# Patient Record
Sex: FEMALE | Race: WHITE | HISPANIC OR LATINO | URBAN - METROPOLITAN AREA
[De-identification: names, ages, dates, MRNs, and addresses within clinical notes are randomized per-mention and may not be internally consistent; named-entity substitution may affect disease eponyms.]

---

## 2017-06-15 ENCOUNTER — EMERGENCY (EMERGENCY)
Facility: HOSPITAL | Age: 67
LOS: 1 days | Discharge: HOME | End: 2017-06-15
Attending: EMERGENCY MEDICINE | Admitting: EMERGENCY MEDICINE
Payer: MEDICARE

## 2017-06-15 VITALS
SYSTOLIC BLOOD PRESSURE: 145 MMHG | RESPIRATION RATE: 17 BRPM | OXYGEN SATURATION: 100 % | TEMPERATURE: 98 F | DIASTOLIC BLOOD PRESSURE: 85 MMHG | HEART RATE: 77 BPM

## 2017-06-15 DIAGNOSIS — Z90.710 ACQUIRED ABSENCE OF BOTH CERVIX AND UTERUS: Chronic | ICD-10-CM

## 2017-06-15 LAB
ALBUMIN SERPL ELPH-MCNC: 4.5 G/DL — SIGNIFICANT CHANGE UP (ref 3.3–5)
ALP SERPL-CCNC: 50 U/L — SIGNIFICANT CHANGE UP (ref 40–120)
ALT FLD-CCNC: 14 U/L — SIGNIFICANT CHANGE UP (ref 4–33)
AST SERPL-CCNC: 16 U/L — SIGNIFICANT CHANGE UP (ref 4–32)
BASOPHILS # BLD AUTO: 0.03 K/UL — SIGNIFICANT CHANGE UP (ref 0–0.2)
BASOPHILS NFR BLD AUTO: 0.4 % — SIGNIFICANT CHANGE UP (ref 0–2)
BILIRUB SERPL-MCNC: 0.2 MG/DL — SIGNIFICANT CHANGE UP (ref 0.2–1.2)
BUN SERPL-MCNC: 13 MG/DL — SIGNIFICANT CHANGE UP (ref 7–23)
CALCIUM SERPL-MCNC: 9.8 MG/DL — SIGNIFICANT CHANGE UP (ref 8.4–10.5)
CHLORIDE SERPL-SCNC: 100 MMOL/L — SIGNIFICANT CHANGE UP (ref 98–107)
CK MB BLD-MCNC: 1.21 NG/ML — SIGNIFICANT CHANGE UP (ref 1–4.7)
CK SERPL-CCNC: 103 U/L — SIGNIFICANT CHANGE UP (ref 25–170)
CO2 SERPL-SCNC: 27 MMOL/L — SIGNIFICANT CHANGE UP (ref 22–31)
CREAT SERPL-MCNC: 0.7 MG/DL — SIGNIFICANT CHANGE UP (ref 0.5–1.3)
D DIMER BLD IA.RAPID-MCNC: 236 NG/ML — SIGNIFICANT CHANGE UP
EOSINOPHIL # BLD AUTO: 0.18 K/UL — SIGNIFICANT CHANGE UP (ref 0–0.5)
EOSINOPHIL NFR BLD AUTO: 2.5 % — SIGNIFICANT CHANGE UP (ref 0–6)
GLUCOSE SERPL-MCNC: 100 MG/DL — HIGH (ref 70–99)
HCT VFR BLD CALC: 39.5 % — SIGNIFICANT CHANGE UP (ref 34.5–45)
HGB BLD-MCNC: 12.7 G/DL — SIGNIFICANT CHANGE UP (ref 11.5–15.5)
IMM GRANULOCYTES NFR BLD AUTO: 0.1 % — SIGNIFICANT CHANGE UP (ref 0–1.5)
LYMPHOCYTES # BLD AUTO: 2.62 K/UL — SIGNIFICANT CHANGE UP (ref 1–3.3)
LYMPHOCYTES # BLD AUTO: 36.5 % — SIGNIFICANT CHANGE UP (ref 13–44)
MCHC RBC-ENTMCNC: 28.1 PG — SIGNIFICANT CHANGE UP (ref 27–34)
MCHC RBC-ENTMCNC: 32.2 % — SIGNIFICANT CHANGE UP (ref 32–36)
MCV RBC AUTO: 87.4 FL — SIGNIFICANT CHANGE UP (ref 80–100)
MONOCYTES # BLD AUTO: 0.38 K/UL — SIGNIFICANT CHANGE UP (ref 0–0.9)
MONOCYTES NFR BLD AUTO: 5.3 % — SIGNIFICANT CHANGE UP (ref 2–14)
NEUTROPHILS # BLD AUTO: 3.96 K/UL — SIGNIFICANT CHANGE UP (ref 1.8–7.4)
NEUTROPHILS NFR BLD AUTO: 55.2 % — SIGNIFICANT CHANGE UP (ref 43–77)
PLATELET # BLD AUTO: 279 K/UL — SIGNIFICANT CHANGE UP (ref 150–400)
PMV BLD: 10 FL — SIGNIFICANT CHANGE UP (ref 7–13)
POTASSIUM SERPL-MCNC: 4 MMOL/L — SIGNIFICANT CHANGE UP (ref 3.5–5.3)
POTASSIUM SERPL-SCNC: 4 MMOL/L — SIGNIFICANT CHANGE UP (ref 3.5–5.3)
PROT SERPL-MCNC: 7.6 G/DL — SIGNIFICANT CHANGE UP (ref 6–8.3)
RBC # BLD: 4.52 M/UL — SIGNIFICANT CHANGE UP (ref 3.8–5.2)
RBC # FLD: 13 % — SIGNIFICANT CHANGE UP (ref 10.3–14.5)
SODIUM SERPL-SCNC: 142 MMOL/L — SIGNIFICANT CHANGE UP (ref 135–145)
TROPONIN T SERPL-MCNC: < 0.06 NG/ML — SIGNIFICANT CHANGE UP (ref 0–0.06)
WBC # BLD: 7.18 K/UL — SIGNIFICANT CHANGE UP (ref 3.8–10.5)
WBC # FLD AUTO: 7.18 K/UL — SIGNIFICANT CHANGE UP (ref 3.8–10.5)

## 2017-06-15 PROCEDURE — 71020: CPT | Mod: 26

## 2017-06-15 PROCEDURE — 71275 CT ANGIOGRAPHY CHEST: CPT | Mod: 26

## 2017-06-15 PROCEDURE — 99285 EMERGENCY DEPT VISIT HI MDM: CPT | Mod: 25,GC

## 2017-06-15 PROCEDURE — 93010 ELECTROCARDIOGRAM REPORT: CPT

## 2017-06-15 RX ORDER — SODIUM CHLORIDE 9 MG/ML
1000 INJECTION INTRAMUSCULAR; INTRAVENOUS; SUBCUTANEOUS ONCE
Qty: 0 | Refills: 0 | Status: COMPLETED | OUTPATIENT
Start: 2017-06-15 | End: 2017-06-15

## 2017-06-15 RX ORDER — IBUPROFEN 200 MG
600 TABLET ORAL ONCE
Qty: 0 | Refills: 0 | Status: COMPLETED | OUTPATIENT
Start: 2017-06-15 | End: 2017-06-15

## 2017-06-15 RX ADMIN — Medication 600 MILLIGRAM(S): at 17:38

## 2017-06-15 RX ADMIN — Medication 600 MILLIGRAM(S): at 18:08

## 2017-06-15 RX ADMIN — SODIUM CHLORIDE 1000 MILLILITER(S): 9 INJECTION INTRAMUSCULAR; INTRAVENOUS; SUBCUTANEOUS at 19:47

## 2017-06-15 NOTE — ED PROVIDER NOTE - PROGRESS NOTE DETAILS
Eric Sorto MD PGY3: Labs, imaging reviewed. Symptoms improved with symptom targeted therapy. Will discharge with instructions for outpatient follow-up. spoke with Dr. Frankel who is aware pt is here and will follow pt up

## 2017-06-15 NOTE — ED ADULT NURSE NOTE - OBJECTIVE STATEMENT
Patient alert, oriented x3, able to make needs known verbally.  Patient complaining of pain in right chest and right scapula 9/10 severity.  Patient ambulatory free from injury.  Patient vitally stable, no complaints of SOB or palpitations.  Peripheral IV started, labs obtained, medications given as per provider order.  Family at bedside for emotional support.  No sign of acute distress noted, will continue to monitor.

## 2017-06-15 NOTE — ED PROVIDER NOTE - OBJECTIVE STATEMENT
67 yo F HTN, DM, HLD presents for right scapula pain. Patient reports right scapular pain that radiates to chest, constant since last night, worse w/ movement and deep breath. Recent travel from [place] 4 days ago. Pain precedes travel.  Fevers, cough, cp, lifting, fall.  Dr. Frankel, [place]

## 2017-06-15 NOTE — ED PROVIDER NOTE - ATTENDING CONTRIBUTION TO CARE
I performed a face to face evaluation of this patient and performed a full history and physical examination on the patient.  I agree with the resident's history, physical examination, and plan of the patient. I performed a face to face evaluation of this patient and performed a full history and physical examination on the patient.  I agree with the resident's history, physical examination, and plan of the patient.  pt with right scapular pain worse with deep breath and movement and recent travel.  Well appearing with normal heart and lung exam, no cvat or midline ttp, 2+=pulses, and neuro wnl, no scapular ttp.  Worse with movement, likely msk pain, meds, labs, CXR, ekg, ddimer and reassess

## 2017-06-15 NOTE — ED ADULT TRIAGE NOTE - CHIEF COMPLAINT QUOTE
pt c/o of intermittent scapula pain radiating down the breast to the mid back. pain is reproducible with palpation. denies cp, sob. pt appears comfortable in triage.

## 2017-06-15 NOTE — ED PROVIDER NOTE - CARE PLAN
Principal Discharge DX:	Back pain  Instructions for follow-up, activity and diet:	A cause for your pain was not found today; however, we believe it is most likely a pulled muscle or other aches in the muscles of your back.  Call your primary care doctor today or tomorrow to schedule follow up appointment for within the next 3-5 days.  Continue taking your medications as prescribed.  Take ibuprofen (or Motrin) 600mg (3 tablets) up to 4 times per day as needed for pain with food or milk.   Return to this Emergency Department if you experience worsening condition or for any other emergency.

## 2017-06-15 NOTE — ED PROVIDER NOTE - PLAN OF CARE
A cause for your pain was not found today; however, we believe it is most likely a pulled muscle or other aches in the muscles of your back.  Call your primary care doctor today or tomorrow to schedule follow up appointment for within the next 3-5 days.  Continue taking your medications as prescribed.  Take ibuprofen (or Motrin) 600mg (3 tablets) up to 4 times per day as needed for pain with food or milk.   Return to this Emergency Department if you experience worsening condition or for any other emergency.

## 2019-09-14 NOTE — ED PROVIDER NOTE - CROS ED GI ALL NEG
RN went in the room to assess Pt. Pt asleep and refusing to answer RN questions. Verbally agressive when RN attempted to wake Pt up from sleep. \"What the fuck let me sleep\"   negative...

## 2021-09-01 ENCOUNTER — APPOINTMENT (OUTPATIENT)
Dept: ULTRASOUND IMAGING | Facility: CLINIC | Age: 71
End: 2021-09-01
Payer: MEDICARE

## 2021-09-01 ENCOUNTER — APPOINTMENT (OUTPATIENT)
Dept: MAMMOGRAPHY | Facility: CLINIC | Age: 71
End: 2021-09-01
Payer: MEDICARE

## 2021-09-01 PROBLEM — Z00.00 ENCOUNTER FOR PREVENTIVE HEALTH EXAMINATION: Status: ACTIVE | Noted: 2021-09-01

## 2021-09-01 PROCEDURE — 77067 SCR MAMMO BI INCL CAD: CPT

## 2021-09-01 PROCEDURE — 77063 BREAST TOMOSYNTHESIS BI: CPT

## 2021-09-01 PROCEDURE — 76641 ULTRASOUND BREAST COMPLETE: CPT | Mod: 50

## 2021-09-02 ENCOUNTER — APPOINTMENT (OUTPATIENT)
Dept: MAMMOGRAPHY | Facility: CLINIC | Age: 71
End: 2021-09-02

## 2021-09-02 ENCOUNTER — APPOINTMENT (OUTPATIENT)
Dept: ULTRASOUND IMAGING | Facility: CLINIC | Age: 71
End: 2021-09-02

## 2021-09-09 ENCOUNTER — APPOINTMENT (OUTPATIENT)
Dept: MAMMOGRAPHY | Facility: CLINIC | Age: 71
End: 2021-09-09

## 2021-09-09 ENCOUNTER — APPOINTMENT (OUTPATIENT)
Dept: ULTRASOUND IMAGING | Facility: CLINIC | Age: 71
End: 2021-09-09

## 2023-06-09 ENCOUNTER — APPOINTMENT (OUTPATIENT)
Dept: CT IMAGING | Facility: CLINIC | Age: 73
End: 2023-06-09
Payer: MEDICARE

## 2023-06-09 PROCEDURE — 75574 CT ANGIO HRT W/3D IMAGE: CPT | Mod: MH

## 2023-06-12 ENCOUNTER — NON-APPOINTMENT (OUTPATIENT)
Age: 73
End: 2023-06-12

## 2023-06-12 ENCOUNTER — APPOINTMENT (OUTPATIENT)
Dept: ELECTROPHYSIOLOGY | Facility: CLINIC | Age: 73
End: 2023-06-12
Payer: MEDICARE

## 2023-06-12 VITALS
SYSTOLIC BLOOD PRESSURE: 184 MMHG | BODY MASS INDEX: 28.19 KG/M2 | DIASTOLIC BLOOD PRESSURE: 67 MMHG | HEART RATE: 45 BPM | HEIGHT: 61 IN | WEIGHT: 149.31 LBS | OXYGEN SATURATION: 97 %

## 2023-06-12 VITALS — SYSTOLIC BLOOD PRESSURE: 152 MMHG | DIASTOLIC BLOOD PRESSURE: 74 MMHG

## 2023-06-12 DIAGNOSIS — Z78.9 OTHER SPECIFIED HEALTH STATUS: ICD-10-CM

## 2023-06-12 DIAGNOSIS — R00.2 PALPITATIONS: ICD-10-CM

## 2023-06-12 DIAGNOSIS — Z90.710 ACQUIRED ABSENCE OF BOTH CERVIX AND UTERUS: ICD-10-CM

## 2023-06-12 PROCEDURE — 93000 ELECTROCARDIOGRAM COMPLETE: CPT

## 2023-06-12 PROCEDURE — 99205 OFFICE O/P NEW HI 60 MIN: CPT

## 2023-06-12 RX ORDER — VALSARTAN 160 MG/1
160 TABLET, COATED ORAL
Refills: 0 | Status: ACTIVE | COMMUNITY

## 2023-06-12 RX ORDER — HYDROCHLOROTHIAZIDE 25 MG/1
25 TABLET ORAL
Refills: 0 | Status: ACTIVE | COMMUNITY

## 2023-06-12 RX ORDER — AMLODIPINE BESYLATE 10 MG/1
10 TABLET ORAL
Refills: 0 | Status: ACTIVE | COMMUNITY

## 2023-06-12 RX ORDER — ASPIRIN ENTERIC COATED TABLETS 81 MG 81 MG/1
81 TABLET, DELAYED RELEASE ORAL
Refills: 0 | Status: ACTIVE | COMMUNITY

## 2023-06-12 RX ORDER — ATORVASTATIN CALCIUM 20 MG/1
20 TABLET, FILM COATED ORAL
Refills: 0 | Status: ACTIVE | COMMUNITY

## 2023-06-12 NOTE — HISTORY OF PRESENT ILLNESS
[FreeTextEntry1] : Carolina Walton is a 73y/o woman with Hx of HTN, HLD, and recurrent palpitations/dizziness and noted PVCs on EKG who presents today for initial evaluation. Admits recurrent symptoms of palpitations and dizziness and fatigue. Saw Cardiologist and noted to have PVCs on EKG. Underwent 4 day Holter as well as ECHO/stress test and angiogram. Was told ECHO/stress/angio all normal. Was started on metoprolol for PVC suppression but unable to tolerate. Feels symptoms every day which is very bothersome and draining. Denies chest pain, SOB, syncope or near syncope. \par

## 2023-06-12 NOTE — CARDIOLOGY SUMMARY
[de-identified] : 6/2023: normal  [de-identified] : 6/2023: normal LV function  [de-identified] : 6/2023: normal coronaries

## 2023-06-12 NOTE — DISCUSSION/SUMMARY
[EKG obtained to assist in diagnosis and management of assessed problem(s)] : EKG obtained to assist in diagnosis and management of assessed problem(s) [FreeTextEntry1] : Impression:\par \par 1. 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 Given frequent symptomatic PVCs and inability to tolerate medication, recommend undergoing PVC ablation. Risks, benefits, and alternatives to procedure discussed at length. Risks including that of bleeding, infection, stroke, and cardiac tamponade discussed and she verbalizes understanding of all. May take all cardiac medication the day of the procedure. \par \par 2. HTN: resume oral antihypertensives as prescribed. Encouraged heart healthy diet, sodium restriction, and weight loss. Continue regular f/u with Cardiologist for further HTN management.\par \par 3. HLD: resume statin therapy as prescribed and regular f/u with Cardiologist for routine lipid monitoring and management.\par \par Plan for PVC ablation and RTO for f/u post procedure.

## 2023-06-15 ENCOUNTER — OUTPATIENT (OUTPATIENT)
Dept: OUTPATIENT SERVICES | Facility: HOSPITAL | Age: 73
LOS: 1 days | End: 2023-06-15

## 2023-06-15 VITALS
RESPIRATION RATE: 16 BRPM | HEIGHT: 60 IN | HEART RATE: 78 BPM | TEMPERATURE: 97 F | SYSTOLIC BLOOD PRESSURE: 154 MMHG | DIASTOLIC BLOOD PRESSURE: 66 MMHG | OXYGEN SATURATION: 98 % | WEIGHT: 149.91 LBS

## 2023-06-15 DIAGNOSIS — I49.3 VENTRICULAR PREMATURE DEPOLARIZATION: ICD-10-CM

## 2023-06-15 DIAGNOSIS — Z90.710 ACQUIRED ABSENCE OF BOTH CERVIX AND UTERUS: Chronic | ICD-10-CM

## 2023-06-15 LAB
ALBUMIN SERPL ELPH-MCNC: 4.5 G/DL — SIGNIFICANT CHANGE UP (ref 3.3–5)
ALP SERPL-CCNC: 51 U/L — SIGNIFICANT CHANGE UP (ref 40–120)
ALT FLD-CCNC: 11 U/L — SIGNIFICANT CHANGE UP (ref 4–33)
ANION GAP SERPL CALC-SCNC: 15 MMOL/L — HIGH (ref 7–14)
AST SERPL-CCNC: 15 U/L — SIGNIFICANT CHANGE UP (ref 4–32)
BILIRUB SERPL-MCNC: 0.2 MG/DL — SIGNIFICANT CHANGE UP (ref 0.2–1.2)
BLD GP AB SCN SERPL QL: NEGATIVE — SIGNIFICANT CHANGE UP
BUN SERPL-MCNC: 27 MG/DL — HIGH (ref 7–23)
CALCIUM SERPL-MCNC: 9.6 MG/DL — SIGNIFICANT CHANGE UP (ref 8.4–10.5)
CHLORIDE SERPL-SCNC: 100 MMOL/L — SIGNIFICANT CHANGE UP (ref 98–107)
CO2 SERPL-SCNC: 22 MMOL/L — SIGNIFICANT CHANGE UP (ref 22–31)
CREAT SERPL-MCNC: 1.05 MG/DL — SIGNIFICANT CHANGE UP (ref 0.5–1.3)
EGFR: 56 ML/MIN/1.73M2 — LOW
GLUCOSE SERPL-MCNC: 82 MG/DL — SIGNIFICANT CHANGE UP (ref 70–99)
HCT VFR BLD CALC: 36.2 % — SIGNIFICANT CHANGE UP (ref 34.5–45)
HGB BLD-MCNC: 11.8 G/DL — SIGNIFICANT CHANGE UP (ref 11.5–15.5)
MCHC RBC-ENTMCNC: 28.5 PG — SIGNIFICANT CHANGE UP (ref 27–34)
MCHC RBC-ENTMCNC: 32.6 GM/DL — SIGNIFICANT CHANGE UP (ref 32–36)
MCV RBC AUTO: 87.4 FL — SIGNIFICANT CHANGE UP (ref 80–100)
NRBC # BLD: 0 /100 WBCS — SIGNIFICANT CHANGE UP (ref 0–0)
NRBC # FLD: 0 K/UL — SIGNIFICANT CHANGE UP (ref 0–0)
PLATELET # BLD AUTO: 263 K/UL — SIGNIFICANT CHANGE UP (ref 150–400)
POTASSIUM SERPL-MCNC: 4.1 MMOL/L — SIGNIFICANT CHANGE UP (ref 3.5–5.3)
POTASSIUM SERPL-SCNC: 4.1 MMOL/L — SIGNIFICANT CHANGE UP (ref 3.5–5.3)
PROT SERPL-MCNC: 7.4 G/DL — SIGNIFICANT CHANGE UP (ref 6–8.3)
RBC # BLD: 4.14 M/UL — SIGNIFICANT CHANGE UP (ref 3.8–5.2)
RBC # FLD: 12.7 % — SIGNIFICANT CHANGE UP (ref 10.3–14.5)
RH IG SCN BLD-IMP: NEGATIVE — SIGNIFICANT CHANGE UP
SODIUM SERPL-SCNC: 137 MMOL/L — SIGNIFICANT CHANGE UP (ref 135–145)
WBC # BLD: 6.02 K/UL — SIGNIFICANT CHANGE UP (ref 3.8–10.5)
WBC # FLD AUTO: 6.02 K/UL — SIGNIFICANT CHANGE UP (ref 3.8–10.5)

## 2023-06-15 NOTE — H&P PST ADULT - NSICDXPASTMEDICALHX_GEN_ALL_CORE_FT
PAST MEDICAL HISTORY:  Asthma     Fibroids     HLD (hyperlipidemia)     HTN (hypertension)     Palpitation      PAST MEDICAL HISTORY:  Asthma     Fibroids     HLD (hyperlipidemia)     HTN (hypertension)     Palpitation     Ventricular premature depolarization

## 2023-06-15 NOTE — H&P PST ADULT - MUSCULOSKELETAL
normal/ROM intact/normal gait/strength 5/5 bilateral upper extremities/strength 5/5 bilateral lower extremities details… normal gait/strength 5/5 bilateral upper extremities/strength 5/5 bilateral lower extremities/back exam

## 2023-06-15 NOTE — H&P PST ADULT - HISTORY OF PRESENT ILLNESS
72 yr old female presents with history of fatigue, palpitations, and dizziness; Denies LOC or changes in vision  EKG noted PVC, scheduled for PVC ablation  72 yr old female presents with history of fatigue, palpitations, and dizziness while in [place], reports increased frequency; Denies LOC or changes in vision  EKG noted PVC, scheduled for PVC ablation

## 2023-06-15 NOTE — H&P PST ADULT - PROBLEM SELECTOR PLAN 1
Patient scheduled for surgery on: 6/20/23  Patient provided with verbal and written presurgical instructions  Verbalized understanding  with teach back on the following:  Chlorhexidine wash    Lab specimen drawn at PST today: cbc, cmp, type

## 2023-06-15 NOTE — H&P PST ADULT - CARDIOVASCULAR
details… normal/regular rate and rhythm/S1 S2 present/no gallops/no rub/no murmur S1 S2 present/no gallops/no rub/irregular rate and rhythm/murmur

## 2023-06-20 ENCOUNTER — INPATIENT (INPATIENT)
Facility: HOSPITAL | Age: 73
LOS: 0 days | Discharge: HOME | End: 2023-06-21
Attending: STUDENT IN AN ORGANIZED HEALTH CARE EDUCATION/TRAINING PROGRAM | Admitting: STUDENT IN AN ORGANIZED HEALTH CARE EDUCATION/TRAINING PROGRAM
Payer: MEDICARE

## 2023-06-20 VITALS
DIASTOLIC BLOOD PRESSURE: 54 MMHG | RESPIRATION RATE: 18 BRPM | SYSTOLIC BLOOD PRESSURE: 106 MMHG | OXYGEN SATURATION: 99 % | TEMPERATURE: 98 F | HEART RATE: 72 BPM

## 2023-06-20 DIAGNOSIS — I49.3 VENTRICULAR PREMATURE DEPOLARIZATION: ICD-10-CM

## 2023-06-20 DIAGNOSIS — Z90.710 ACQUIRED ABSENCE OF BOTH CERVIX AND UTERUS: Chronic | ICD-10-CM

## 2023-06-20 LAB — RH IG SCN BLD-IMP: NEGATIVE — SIGNIFICANT CHANGE UP

## 2023-06-20 PROCEDURE — 93010 ELECTROCARDIOGRAM REPORT: CPT

## 2023-06-20 PROCEDURE — 93623 PRGRMD STIMJ&PACG IV RX NFS: CPT | Mod: 26

## 2023-06-20 PROCEDURE — 93010 ELECTROCARDIOGRAM REPORT: CPT | Mod: 77

## 2023-06-20 PROCEDURE — 93662 INTRACARDIAC ECG (ICE): CPT | Mod: 26

## 2023-06-20 PROCEDURE — 93654 COMPRE EP EVAL TX VT: CPT

## 2023-06-20 RX ORDER — VALSARTAN 80 MG/1
1 TABLET ORAL
Refills: 0 | DISCHARGE

## 2023-06-20 RX ORDER — AMLODIPINE BESYLATE 2.5 MG/1
1 TABLET ORAL
Refills: 0 | DISCHARGE

## 2023-06-20 RX ORDER — HYDROCHLOROTHIAZIDE 25 MG
1 TABLET ORAL
Refills: 0 | DISCHARGE

## 2023-06-20 RX ORDER — AMLODIPINE BESYLATE 2.5 MG/1
10 TABLET ORAL DAILY
Refills: 0 | Status: DISCONTINUED | OUTPATIENT
Start: 2023-06-21 | End: 2023-06-21

## 2023-06-20 RX ORDER — MEXILETINE HYDROCHLORIDE 150 MG/1
1 CAPSULE ORAL
Qty: 60 | Refills: 1
Start: 2023-06-20 | End: 2023-08-18

## 2023-06-20 RX ORDER — ATORVASTATIN CALCIUM 80 MG/1
1 TABLET, FILM COATED ORAL
Refills: 0 | DISCHARGE

## 2023-06-20 RX ORDER — MEXILETINE HYDROCHLORIDE 150 MG/1
150 CAPSULE ORAL EVERY 12 HOURS
Refills: 0 | Status: DISCONTINUED | OUTPATIENT
Start: 2023-06-20 | End: 2023-06-21

## 2023-06-20 RX ORDER — VALSARTAN 80 MG/1
160 TABLET ORAL DAILY
Refills: 0 | Status: DISCONTINUED | OUTPATIENT
Start: 2023-06-21 | End: 2023-06-21

## 2023-06-20 RX ORDER — SODIUM CHLORIDE 9 MG/ML
3 INJECTION INTRAMUSCULAR; INTRAVENOUS; SUBCUTANEOUS EVERY 8 HOURS
Refills: 0 | Status: DISCONTINUED | OUTPATIENT
Start: 2023-06-20 | End: 2023-06-21

## 2023-06-20 RX ORDER — ATORVASTATIN CALCIUM 80 MG/1
20 TABLET, FILM COATED ORAL AT BEDTIME
Refills: 0 | Status: DISCONTINUED | OUTPATIENT
Start: 2023-06-20 | End: 2023-06-21

## 2023-06-20 RX ORDER — ASPIRIN/CALCIUM CARB/MAGNESIUM 324 MG
1 TABLET ORAL
Refills: 0 | DISCHARGE

## 2023-06-20 RX ORDER — APIXABAN 2.5 MG/1
1 TABLET, FILM COATED ORAL
Qty: 28 | Refills: 0
Start: 2023-06-20 | End: 2023-07-03

## 2023-06-20 RX ORDER — ASPIRIN/CALCIUM CARB/MAGNESIUM 324 MG
81 TABLET ORAL DAILY
Refills: 0 | Status: DISCONTINUED | OUTPATIENT
Start: 2023-06-21 | End: 2023-06-21

## 2023-06-20 RX ORDER — MEXILETINE HYDROCHLORIDE 150 MG/1
1 CAPSULE ORAL
Qty: 60 | Refills: 0
Start: 2023-06-20 | End: 2023-07-19

## 2023-06-20 RX ADMIN — SODIUM CHLORIDE 3 MILLILITER(S): 9 INJECTION INTRAMUSCULAR; INTRAVENOUS; SUBCUTANEOUS at 22:00

## 2023-06-20 RX ADMIN — ATORVASTATIN CALCIUM 20 MILLIGRAM(S): 80 TABLET, FILM COATED ORAL at 21:54

## 2023-06-20 RX ADMIN — MEXILETINE HYDROCHLORIDE 150 MILLIGRAM(S): 150 CAPSULE ORAL at 16:06

## 2023-06-20 NOTE — CHART NOTE - NSCHARTNOTEFT_GEN_A_CORE
advised by Dr Butt, patient s/p left and right sided PVC ablation via RFA/RFV access; start Mexiletine 150mg po BID, first dose when she wakes from anesthesia and Eliquis 5mg po BID for 14days. Both E-presribed to [facility]. patient advised.   Plan for discharge to home this evening if patient remains stable and groin site remains stable ambulating

## 2023-06-20 NOTE — CHART NOTE - NSCHARTNOTEFT_GEN_A_CORE
Pt is s/p left and right sided PVC ablation via right femoral access. Site is stable w/ no active bleeding, clean/dry/intact. Ecchymosis present i/s/o hematoma, nontender to palpation. DP pulse is palpable. Pt w/ no complaints at this time.    Vital Signs Last 24 Hrs  T(C): 36.3 (20 Jun 2023 21:49), Max: 36.6 (20 Jun 2023 17:45)  T(F): 97.4 (20 Jun 2023 21:49), Max: 97.8 (20 Jun 2023 17:45)  HR: 62 (20 Jun 2023 21:49) (62 - 72)  BP: 120/64 (20 Jun 2023 21:49) (106/54 - 120/64)  RR: 17 (20 Jun 2023 21:49) (17 - 18)  SpO2: 99% (20 Jun 2023 21:49) (99% - 99%)    Parameters below as of 20 Jun 2023 21:49  Patient On (Oxygen Delivery Method): room air    Pt is hemodynamically stable, will continue to monitor overnight.

## 2023-06-20 NOTE — CHART NOTE - NSCHARTNOTEFT_GEN_A_CORE
The patient presents today for PVC ablation  See H&P from presurgical testing.   The patient denies any new complaints since the last time she was seen by Dr. Butt.  Medications reviewed.    NPO Date and Time: 6/19/23 at 2100  Mallampati: 2  Anticoagulation Date and Time? N/A  Previous Endoscopy?  NO  Hx of CVA?  NO  Sleep Apnea?  NO  Dentures? NO  Loose Teeth? NO      Patient Denies:    Prior difficult intubation or airway problems  Odynophagia  Dysphagia  Esophageal stricture  Esophageal tumor  Esophageal varices  Esophageal perforation/laceration  Esophageal diverticulum  Large diaphragmatic Hernia  Active or recent upper gastrointestinal (GI) bleed  History of GI surgery  History of Esophageal surgery  History of Kingsley's esophagus  Tenuous cardiorespiratory status  Cervical spine arthritis with reduced range of motion or atlantoaxial joint disease. History of radiation to head, neck, or mediastinum  Severe thrombocytopenia  Obstructive sleep apnea The patient presents today for PVC ablation  See H&P from presurgical testing.   The patient denies any new complaints since the last time she was seen by Dr. Butt.  Medications reviewed.    NPO Date and Time: 6/19/23 at 1930  Mallampati: 2  Anticoagulation Date and Time? N/A  Previous Endoscopy?  NO  Hx of CVA?  NO  Sleep Apnea?  NO  Dentures? NO  Loose Teeth? NO      Patient Denies:    Prior difficult intubation or airway problems  Odynophagia  Dysphagia  Esophageal stricture  Esophageal tumor  Esophageal varices  Esophageal perforation/laceration  Esophageal diverticulum  Large diaphragmatic Hernia  Active or recent upper gastrointestinal (GI) bleed  History of GI surgery  History of Esophageal surgery  History of Kingsley's esophagus  Tenuous cardiorespiratory status  Cervical spine arthritis with reduced range of motion or atlantoaxial joint disease. History of radiation to head, neck, or mediastinum  Severe thrombocytopenia  Obstructive sleep apnea

## 2023-06-20 NOTE — CHART NOTE - NSCHARTNOTEFT_GEN_A_CORE
called to patients bedside as noted bleeding at groin site, arrived at bedside and RN holding pressure; I immediately took over manual compression, found large right groin hematoma from RFA site with 20minutes manual compression placed with success to soft, nontender site with +ecchymosis at site  6/20 EP: s/p left and right sided PVC ablation; RFA manual pressure, RFV vascade closure device x2  large hematoma of right groin compressed to soft, +ecchymosis, DP 2+   Dr Butt aware who saw patient at bedside  bedrest extended, patient and family aware, plan to admit overnight for groin observation

## 2023-06-21 ENCOUNTER — TRANSCRIPTION ENCOUNTER (OUTPATIENT)
Age: 73
End: 2023-06-21

## 2023-06-21 VITALS
SYSTOLIC BLOOD PRESSURE: 135 MMHG | OXYGEN SATURATION: 99 % | TEMPERATURE: 98 F | RESPIRATION RATE: 18 BRPM | HEART RATE: 66 BPM | DIASTOLIC BLOOD PRESSURE: 73 MMHG

## 2023-06-21 LAB
ANION GAP SERPL CALC-SCNC: 17 MMOL/L — HIGH (ref 7–14)
BUN SERPL-MCNC: 33 MG/DL — HIGH (ref 7–23)
CALCIUM SERPL-MCNC: 9.3 MG/DL — SIGNIFICANT CHANGE UP (ref 8.4–10.5)
CHLORIDE SERPL-SCNC: 100 MMOL/L — SIGNIFICANT CHANGE UP (ref 98–107)
CO2 SERPL-SCNC: 21 MMOL/L — LOW (ref 22–31)
CREAT SERPL-MCNC: 0.9 MG/DL — SIGNIFICANT CHANGE UP (ref 0.5–1.3)
EGFR: 67 ML/MIN/1.73M2 — SIGNIFICANT CHANGE UP
GLUCOSE SERPL-MCNC: 153 MG/DL — HIGH (ref 70–99)
HCT VFR BLD CALC: 34.6 % — SIGNIFICANT CHANGE UP (ref 34.5–45)
HGB BLD-MCNC: 11.6 G/DL — SIGNIFICANT CHANGE UP (ref 11.5–15.5)
MCHC RBC-ENTMCNC: 28.4 PG — SIGNIFICANT CHANGE UP (ref 27–34)
MCHC RBC-ENTMCNC: 33.5 GM/DL — SIGNIFICANT CHANGE UP (ref 32–36)
MCV RBC AUTO: 84.6 FL — SIGNIFICANT CHANGE UP (ref 80–100)
NRBC # BLD: 0 /100 WBCS — SIGNIFICANT CHANGE UP (ref 0–0)
NRBC # FLD: 0 K/UL — SIGNIFICANT CHANGE UP (ref 0–0)
PLATELET # BLD AUTO: 268 K/UL — SIGNIFICANT CHANGE UP (ref 150–400)
POTASSIUM SERPL-MCNC: 3.8 MMOL/L — SIGNIFICANT CHANGE UP (ref 3.5–5.3)
POTASSIUM SERPL-SCNC: 3.8 MMOL/L — SIGNIFICANT CHANGE UP (ref 3.5–5.3)
RBC # BLD: 4.09 M/UL — SIGNIFICANT CHANGE UP (ref 3.8–5.2)
RBC # FLD: 12.3 % — SIGNIFICANT CHANGE UP (ref 10.3–14.5)
SODIUM SERPL-SCNC: 138 MMOL/L — SIGNIFICANT CHANGE UP (ref 135–145)
WBC # BLD: 8.02 K/UL — SIGNIFICANT CHANGE UP (ref 3.8–10.5)
WBC # FLD AUTO: 8.02 K/UL — SIGNIFICANT CHANGE UP (ref 3.8–10.5)

## 2023-06-21 PROCEDURE — 99232 SBSQ HOSP IP/OBS MODERATE 35: CPT

## 2023-06-21 RX ORDER — APIXABAN 2.5 MG/1
1 TABLET, FILM COATED ORAL
Qty: 28 | Refills: 0
Start: 2023-06-21 | End: 2023-07-04

## 2023-06-21 RX ORDER — ATORVASTATIN CALCIUM 80 MG/1
1 TABLET, FILM COATED ORAL
Qty: 0 | Refills: 0 | DISCHARGE
Start: 2023-06-21

## 2023-06-21 RX ADMIN — MEXILETINE HYDROCHLORIDE 150 MILLIGRAM(S): 150 CAPSULE ORAL at 05:33

## 2023-06-21 RX ADMIN — VALSARTAN 160 MILLIGRAM(S): 80 TABLET ORAL at 05:34

## 2023-06-21 RX ADMIN — AMLODIPINE BESYLATE 10 MILLIGRAM(S): 2.5 TABLET ORAL at 05:34

## 2023-06-21 RX ADMIN — SODIUM CHLORIDE 3 MILLILITER(S): 9 INJECTION INTRAMUSCULAR; INTRAVENOUS; SUBCUTANEOUS at 05:56

## 2023-06-21 NOTE — DISCHARGE NOTE PROVIDER - NSDCFUSCHEDAPPT_GEN_ALL_CORE_FT
[facility]  [facility]  [phone]  Scheduled Appointment: 07/31/2023

## 2023-06-21 NOTE — PROGRESS NOTE ADULT - NS ATTEND AMEND GEN_ALL_CORE FT
72 yr old female presents with history of fatigue, palpitations, and dizziness while in [place], reports increased frequency; Denies LOC or changes in vision  EKG noted PVC s/p PVC ablation on 6/20. Cont BB and mexelitine. Start eliquis for 2 weeks. Will fu as outpt.

## 2023-06-21 NOTE — DISCHARGE NOTE NURSING/CASE MANAGEMENT/SOCIAL WORK - NSDCPEELIQUISCOMP_GEN_ALL_CORE
English
Apixaban/Eliquis is used to treat and prevent blood clots. If you are not able to swallow the tablets whole, they may be crushed and mixed in water, apple juice, or applesauce and promptly taken within four hours. Never skip a dose of Apixaban/Eliquis. If you forget to take your Apixaban/Eliquis, take a dose as soon as you remember. If it is almost time for your next Apixaban/Eliquis dose, wait until then and take a regular dose. DO NOT take an extra pill to ‘catch up’.  NEVER TAKE A DOUBLE DOSE. Notify your doctor that you missed a dose. Take Apixaban/Eliquis at the same time each morning and evening. Apixaban/Eliquis may be taken with other medication or food.

## 2023-06-21 NOTE — DISCHARGE NOTE NURSING/CASE MANAGEMENT/SOCIAL WORK - NSDCPEFALRISK_GEN_ALL_CORE
For information on Fall & Injury Prevention, visit: [email or web address] OR  [email or web address] OR  https://www.cdc.gov/steadi/patient.html

## 2023-06-21 NOTE — PROGRESS NOTE ADULT - ASSESSMENT
72 yr old female presents with history of fatigue, palpitations, and dizziness while in [place], reports increased frequency; Denies LOC or changes in vision  EKG noted PVC s/p PVC ablation on 6/20    Patient s/p ablation   Post procedure complicated by small groin hematoma that was pressed out   Currently groin dressing removed; no bleeding noted; erythema noted and site is tender to palpation; site is soft; distal pulses 2+   Discharge instructions reviewed with patient and written instructions given   Patient understands she will take off groin dressing 24 hours from procedure and then can shower with only water at the site; no soap, lotion, cream is to be applied to site ; patient will not put any dressing on the site once the initial dressing is removed  Site will not be submerged under water x 1 week including activities in the pool, bath or jacuzzi   Activity restrictions went over with patient including 1 week of refraining from strenuous activity including jogging, running, or driving   Any indication of infection including fever, chills, redness, swelling or discharge at the site - patient will call office [phone]  For serious symptoms like chest pain, SOB, lightheadedness, dizziness or syncope - patient was directed to the closest ER  All medications and diet reviewed   Follow date and time given    72 yr old female presents with history of fatigue, palpitations, and dizziness while in [place], reports increased frequency; Denies LOC or changes in vision  EKG noted PVC s/p PVC ablation on 6/20    Patient s/p ablation   Post procedure complicated by small groin hematoma that was pressed out   Currently groin dressing removed; no bleeding noted; erythema noted and site is tender to palpation; site is soft; distal pulses 2+   Discharge instructions reviewed with patient and written instructions given   Patient understands she will take off groin dressing 24 hours from procedure and then can shower with only water at the site; no soap, lotion, cream is to be applied to site ; patient will not put any dressing on the site once the initial dressing is removed  Site will not be submerged under water x 1 week including activities in the pool, bath or jacuzzi   Activity restrictions went over with patient including 1 week of refraining from strenuous activity including jogging, running, or driving   Any indication of infection including fever, chills, redness, swelling or discharge at the site - patient will call office [phone]  For serious symptoms like chest pain, SOB, lightheadedness, dizziness or syncope - patient was directed to the closest ER  All medications and diet reviewed   Follow date and time given   Eliquis 5mg PO BID x 2 weeks - start eliquis tomorrow

## 2023-06-21 NOTE — DISCHARGE NOTE NURSING/CASE MANAGEMENT/SOCIAL WORK - NSDCFUADDAPPT_GEN_ALL_CORE_FT
Follow up with Dr Butt on July 31st at 11:15am or sooner if needed- please see appointment letter  Start mexiletine 150mg oral every 12hours  Start Eliquis 5mg oral every 12hours, please take your first dose the morning of 6/22 if groin remains with no bleed and no swelling. You will be on this for 2 weeks  Continue other medications as prescribed  Tylenol as needed for site discomfort

## 2023-06-21 NOTE — DISCHARGE NOTE PROVIDER - CARE PROVIDER_API CALL
Alicia Butt  Internal Medicine  [address] [place] [address]  Phone: [phone]  Fax: [phone]  Established Patient  Follow Up Time:    Alicia Butt  Internal Medicine  [address] [place] [address]  Phone: [phone]  Fax: [phone]  Established Patient  Follow Up Time:     Elkin Caputo  Endocrinology/Metab/Diabetes  [address] [place] [address]  Phone: [phone]  Fax: [phone]  Follow Up Time:

## 2023-06-21 NOTE — DISCHARGE NOTE PROVIDER - HOSPITAL COURSE
72 yr old female presents with history of fatigue, palpitations, and dizziness while in [place], reports increased frequency; Denies LOC or changes in vision  EKG noted PVC, scheduled for PVC ablation     Ventricular premature depolarization.   - s/p PVC ab;atopm 6/20 EP: s/p left and right sided PVC ablation; RFA manual pressure, RFV vascade closure device x2  - large hematoma post procedure compressed to soft, +ecchymosis  - Admit overnight for groin site monitoring   start Eliquis 5mg po BID [id number] if EP APPROVES  [ ] started mexiletine 150mg po BID   Case discussed with EP team- pateint stable for discharge pita 72 yr old female presents with history of fatigue, palpitations, and dizziness while in [place], reports increased frequency; Denies LOC or changes in vision  EKG noted PVC, scheduled for PVC ablation     Ventricular premature depolarization.   - s/p PVC ablation 6/20 EP: s/p left and right sided PVC ablation; RFA manual pressure, RFV vascade closure device x2  - large hematoma post procedure compressed to soft, +ecchymosis. Now no hematoma, pain swelling, bleeding discharge. NO bruits. Pulses 2+ bilaterally  - Admit overnight for groin site monitoring   start Eliquis 5mg po BID [id number] approved by EP  [ ] started mexiletine 150mg po BID   Case discussed with EP team- patient stable for discharge home

## 2023-06-21 NOTE — PROGRESS NOTE ADULT - SUBJECTIVE AND OBJECTIVE BOX
Interval History:  No acute events overnight  Telemetry: NSR     MEDICATIONS  (STANDING):  amLODIPine   Tablet 10 milliGRAM(s) Oral daily  aspirin enteric coated 81 milliGRAM(s) Oral daily  atorvastatin 20 milliGRAM(s) Oral at bedtime  hydrochlorothiazide 25 milliGRAM(s) Oral daily  mexiletine 150 milliGRAM(s) Oral every 12 hours  sodium chloride 0.9% lock flush 3 milliLiter(s) IV Push every 8 hours  valsartan 160 milliGRAM(s) Oral daily    MEDICATIONS  (PRN):    Vital Signs Last 24 Hrs  T(C): 36.6 (06-21-23 @ 05:30), Max: 36.6 (06-20-23 @ 17:45)  T(F): 97.8 (06-21-23 @ 05:30), Max: 97.8 (06-20-23 @ 17:45)  HR: 66 (06-21-23 @ 05:30) (62 - 72)  BP: 135/73 (06-21-23 @ 05:30) (106/54 - 135/73)  BP(mean): --  RR: 18 (06-21-23 @ 05:30) (17 - 18)  SpO2: 99% (06-21-23 @ 05:30) (99% - 99%)    Appearance: Normal	  HEENT:   Normal oral mucosa, PERRL, EOMI	  Lymphatic: No lymphadenopathy  Cardiovascular: Normal S1 S2, No JVD, No murmurs, No edema  Respiratory: Lungs clear to auscultation	  Psychiatry: A & O x 3, Mood & affect appropriate  Gastrointestinal:  Soft, Non-tender, + BS	  Skin: No rashes, No ecchymoses, No cyanosis	  Neurologic: Non-focal  Extremities: Normal range of motion, No clubbing, cyanosis or edema  Vascular: Peripheral pulses palpable 2+ bilaterally    LABS:	 	    CBC Full  -  ( 21 Jun 2023 05:48 )  WBC Count : 8.02 K/uL  Hemoglobin : 11.6 g/dL  Hematocrit : 34.6 %  Platelet Count - Automated : 268 K/uL  Mean Cell Volume : 84.6 fL  Mean Cell Hemoglobin : 28.4 pg  Mean Cell Hemoglobin Concentration : 33.5 gm/dL  Auto Neutrophil # : x  Auto Lymphocyte # : x  Auto Monocyte # : x  Auto Eosinophil # : x  Auto Basophil # : x  Auto Neutrophil % : x  Auto Lymphocyte % : x  Auto Monocyte % : x  Auto Eosinophil % : x  Auto Basophil % : x    06-21    138  |  100  |  33<H>  ----------------------------<  153<H>  3.8   |  21<L>  |  0.90    Ca    9.3      21 Jun 2023 05:48

## 2023-06-21 NOTE — DISCHARGE NOTE PROVIDER - NSDCFUADDINST_GEN_ALL_CORE_FT
PLEASE SEE POST ABLATION INSTURCTIONS  No driving x 24 hours. No strenuous activity for three weeks. Monitor site of procedure and notify your doctor for any  bleeding / swelling/redness/ discharge/pain. You may shower but no baths or swimming x one week. No heavy lifting x 1 week.

## 2023-06-21 NOTE — DISCHARGE NOTE NURSING/CASE MANAGEMENT/SOCIAL WORK - PATIENT PORTAL LINK FT
You can access the FollowMyHealth Patient Portal offered by [facility] by registering at the following website: [email or web address]. By joining [facility]’s FollowMyHealth portal, you will also be able to view your health information using other applications (apps) compatible with our system.

## 2023-06-21 NOTE — DISCHARGE NOTE PROVIDER - NSDCMRMEDTOKEN_GEN_ALL_CORE_FT
amLODIPine 10 mg oral tablet: 1 orally once a day am  aspirin 81 mg oral tablet: 1 orally once a day am  atorvastatin 20 mg oral tablet: 1 orally once a day (at bedtime)  atorvastatin 20 mg oral tablet: 1 tab(s) orally once a day (at bedtime)  Eliquis 5 mg oral tablet: 1 tab(s) orally 2 times a day  hydroCHLOROthiazide 25 mg oral tablet: 1 orally once a day am  mexiletine 150 mg oral capsule: 1 cap(s) orally 2 times a day  valsartan 160 mg oral tablet: 1 orally once a day am   amLODIPine 10 mg oral tablet: 1 orally once a day am  aspirin 81 mg oral tablet: 1 orally once a day am  atorvastatin 20 mg oral tablet: 1 orally once a day (at bedtime)  atorvastatin 20 mg oral tablet: 1 tab(s) orally once a day (at bedtime)  Eliquis 5 mg oral tablet: 1 tab(s) orally 2 times a day Please start on 6/22/2023  hydroCHLOROthiazide 25 mg oral tablet: 1 orally once a day am  mexiletine 150 mg oral capsule: 1 cap(s) orally 2 times a day  valsartan 160 mg oral tablet: 1 orally once a day am

## 2023-06-21 NOTE — DISCHARGE NOTE PROVIDER - NSDCFUADDAPPT_GEN_ALL_CORE_FT
Follow up with Dr Butt on July 31st at 11:15am or sooner if needed- please see appointment letter  Start mexiletine 150mg oral every 12hours  Start Eliquis 5mg oral every 12hours, please take your first dose the morning of 6/21 if groin remains with no bleed and no swelling  Continue other medications as prescribed  Tylenol as needed for site discomfort Follow up with Dr Butt on July 31st at 11:15am or sooner if needed- please see appointment letter  Start mexiletine 150mg oral every 12hours  Start Eliquis 5mg oral every 12hours, please take your first dose the morning of 6/22 if groin remains with no bleed and no swelling. You will be on this for 2 weeks  Continue other medications as prescribed  Tylenol as needed for site discomfort

## 2023-06-21 NOTE — DISCHARGE NOTE PROVIDER - PROVIDER TOKENS
PROVIDER:[TOKEN:[[id number]],ESTABLISHEDPATIENT:[T]] PROVIDER:[TOKEN:[[id number]],ESTABLISHEDPATIENT:[T]],PROVIDER:[TOKEN:[[id number]]]

## 2023-06-21 NOTE — DISCHARGE NOTE PROVIDER - NSDCCPCAREPLAN_GEN_ALL_CORE_FT
PRINCIPAL DISCHARGE DIAGNOSIS  Diagnosis: Symptomatic PVCs  Assessment and Plan of Treatment: you had an ablation procedure  Start eliquis  Start mexeilitine      SECONDARY DISCHARGE DIAGNOSES  Diagnosis: Hypertension  Assessment and Plan of Treatment: low salt, low fat, low cholesterol  Continue hydochlorothiazide  Continue norvasc  Continue valsartan    Diagnosis: Hyperlipidemia  Assessment and Plan of Treatment: Continue lipitor     PRINCIPAL DISCHARGE DIAGNOSIS  Diagnosis: Symptomatic PVCs  Assessment and Plan of Treatment: You had an ablation procedure  Start eliquis for two weeks- start this on 6/22/2023  Start mexilitine  Follow up with Electrophisiology in 1-2 weeks- see appontment letter      SECONDARY DISCHARGE DIAGNOSES  Diagnosis: Hypertension  Assessment and Plan of Treatment: low salt, low fat, low cholesterol  Continue hydochlorothiazide  Continue norvasc  Continue valsartan    Diagnosis: Hyperlipidemia  Assessment and Plan of Treatment: Continue lipitor     PRINCIPAL DISCHARGE DIAGNOSIS  Diagnosis: Symptomatic PVCs  Assessment and Plan of Treatment: You had an ablation procedure  Start eliquis for two weeks- start this on 6/22/2023  Start mexilitine  Follow up with Electrophisiology in 1-2 weeks- see appontment letter      SECONDARY DISCHARGE DIAGNOSES  Diagnosis: Hypertension  Assessment and Plan of Treatment: low salt, low fat, low cholesterol  Continue hydochlorothiazide  Continue norvasc  Continue valsartan    Diagnosis: Hyperlipidemia  Assessment and Plan of Treatment: Continue lipitor    Diagnosis: Diabetes mellitus  Assessment and Plan of Treatment: 1800 calorie diabetic diet/ low salt, low fat , low cholesterol   You have a history of diabetes.Please follow up with an endocrinologist as you have discontinued all your previous medications

## 2023-06-21 NOTE — PROGRESS NOTE ADULT - SUBJECTIVE AND OBJECTIVE BOX
ANESTHESIA POSTOP CHECK    72y Female POSTOP DAY 1 S/P     Vital Signs Last 24 Hrs  T(C): 36.6 (21 Jun 2023 05:30), Max: 36.6 (20 Jun 2023 17:45)  T(F): 97.8 (21 Jun 2023 05:30), Max: 97.8 (20 Jun 2023 17:45)  HR: 66 (21 Jun 2023 05:30) (62 - 72)  BP: 135/73 (21 Jun 2023 05:30) (106/54 - 135/73)  BP(mean): --  RR: 18 (21 Jun 2023 05:30) (17 - 18)  SpO2: 99% (21 Jun 2023 05:30) (99% - 99%)    Parameters below as of 21 Jun 2023 05:30  Patient On (Oxygen Delivery Method): room air      I&O's Summary      [ x] NO APPARENT ANESTHESIA COMPLICATIONS

## 2023-07-31 ENCOUNTER — APPOINTMENT (OUTPATIENT)
Dept: ELECTROPHYSIOLOGY | Facility: CLINIC | Age: 73
End: 2023-07-31
Payer: MEDICARE

## 2023-07-31 VITALS
HEART RATE: 78 BPM | HEIGHT: 61 IN | DIASTOLIC BLOOD PRESSURE: 75 MMHG | SYSTOLIC BLOOD PRESSURE: 130 MMHG | OXYGEN SATURATION: 99 %

## 2023-07-31 DIAGNOSIS — Z87.898 PERSONAL HISTORY OF OTHER SPECIFIED CONDITIONS: ICD-10-CM

## 2023-07-31 DIAGNOSIS — I10 ESSENTIAL (PRIMARY) HYPERTENSION: ICD-10-CM

## 2023-07-31 DIAGNOSIS — I49.3 VENTRICULAR PREMATURE DEPOLARIZATION: ICD-10-CM

## 2023-07-31 DIAGNOSIS — E78.5 HYPERLIPIDEMIA, UNSPECIFIED: ICD-10-CM

## 2023-07-31 DIAGNOSIS — Z86.79 OTHER SPECIFIED POSTPROCEDURAL STATES: ICD-10-CM

## 2023-07-31 DIAGNOSIS — Z98.890 OTHER SPECIFIED POSTPROCEDURAL STATES: ICD-10-CM

## 2023-07-31 PROCEDURE — 93000 ELECTROCARDIOGRAM COMPLETE: CPT

## 2023-07-31 PROCEDURE — 99213 OFFICE O/P EST LOW 20 MIN: CPT

## 2023-07-31 NOTE — DISCUSSION/SUMMARY
[EKG obtained to assist in diagnosis and management of assessed problem(s)] : EKG obtained to assist in diagnosis and management of assessed problem(s) [FreeTextEntry1] : Impression:  1. PVCs: s/p RVOT anteroseptal and LV septum PVC ablation on 6/20/2023. EKG performed today to assess for presence of conduction disease and reveals NSR without presence of PVCs. ECHO with normal LV function. Remains off mexiletine and off AV nodals.   2. HTN: resume oral antihypertensives as prescribed. Encouraged heart healthy diet, sodium restriction, and weight loss. Continue regular f/u with Cardiologist for further HTN management.  3. HLD: resume statin therapy as prescribed and regular f/u with Cardiologist for routine lipid monitoring and management.  Continue f/u with Cardiologist and RTO for f/u in 6 months.

## 2023-07-31 NOTE — CARDIOLOGY SUMMARY
[de-identified] : 6/2023: normal  [de-identified] : 6/2023: normal LV function  [de-identified] : 6/2023: normal coronaries

## 2023-07-31 NOTE — HISTORY OF PRESENT ILLNESS
[FreeTextEntry1] : Carolina Walton is a 74y/o woman with Hx of HTN, HLD, and recurrent palpitations/dizziness and noted PVCs on EKG now s/p  RVOT anteroseptal and LV septum PVC ablation on 6/20/2023 who presents today for routine f/u post ablation. Admits doing well post procedure. Was on mexiletine but now discontinued due to adverse effects. No longer feeling any PVCs. Right groin without pain, swelling, or bruising. Denies chest pain, palpitations, SOB, syncope or near syncope.

## 2024-12-27 PROBLEM — I49.3 VENTRICULAR PREMATURE DEPOLARIZATION: Chronic | Status: ACTIVE | Noted: 2023-06-15

## 2024-12-27 PROBLEM — R00.2 PALPITATIONS: Chronic | Status: ACTIVE | Noted: 2023-06-15

## 2024-12-27 PROBLEM — J45.909 UNSPECIFIED ASTHMA, UNCOMPLICATED: Chronic | Status: ACTIVE | Noted: 2023-06-15

## 2024-12-27 PROBLEM — I10 ESSENTIAL (PRIMARY) HYPERTENSION: Chronic | Status: ACTIVE | Noted: 2023-06-15

## 2024-12-27 PROBLEM — E78.5 HYPERLIPIDEMIA, UNSPECIFIED: Chronic | Status: ACTIVE | Noted: 2023-06-15

## 2024-12-27 PROBLEM — D21.9 BENIGN NEOPLASM OF CONNECTIVE AND OTHER SOFT TISSUE, UNSPECIFIED: Chronic | Status: ACTIVE | Noted: 2023-06-15

## 2025-01-04 ENCOUNTER — EMERGENCY (EMERGENCY)
Facility: HOSPITAL | Age: 75
LOS: 1 days | Discharge: HOME | End: 2025-01-04
Attending: EMERGENCY MEDICINE | Admitting: EMERGENCY MEDICINE
Payer: MEDICARE

## 2025-01-04 VITALS
WEIGHT: 147.93 LBS | SYSTOLIC BLOOD PRESSURE: 150 MMHG | TEMPERATURE: 98 F | HEART RATE: 70 BPM | OXYGEN SATURATION: 99 % | DIASTOLIC BLOOD PRESSURE: 84 MMHG | RESPIRATION RATE: 18 BRPM

## 2025-01-04 DIAGNOSIS — Z90.710 ACQUIRED ABSENCE OF BOTH CERVIX AND UTERUS: Chronic | ICD-10-CM

## 2025-01-04 LAB
ALBUMIN SERPL ELPH-MCNC: 4.6 G/DL — SIGNIFICANT CHANGE UP (ref 3.3–5)
ALP SERPL-CCNC: 100 U/L — SIGNIFICANT CHANGE UP (ref 40–120)
ALT FLD-CCNC: 9 U/L — SIGNIFICANT CHANGE UP (ref 4–33)
ANION GAP SERPL CALC-SCNC: 16 MMOL/L — HIGH (ref 7–14)
AST SERPL-CCNC: 14 U/L — SIGNIFICANT CHANGE UP (ref 4–32)
B-OH-BUTYR SERPL-SCNC: 1.9 MMOL/L — HIGH (ref 0–0.4)
BASOPHILS # BLD AUTO: 0.04 K/UL — SIGNIFICANT CHANGE UP (ref 0–0.2)
BASOPHILS NFR BLD AUTO: 0.3 % — SIGNIFICANT CHANGE UP (ref 0–2)
BILIRUB SERPL-MCNC: 0.4 MG/DL — SIGNIFICANT CHANGE UP (ref 0.2–1.2)
BLOOD GAS VENOUS COMPREHENSIVE RESULT: SIGNIFICANT CHANGE UP
BUN SERPL-MCNC: 36 MG/DL — HIGH (ref 7–23)
CALCIUM SERPL-MCNC: 10.1 MG/DL — SIGNIFICANT CHANGE UP (ref 8.4–10.5)
CHLORIDE SERPL-SCNC: 90 MMOL/L — LOW (ref 98–107)
CO2 SERPL-SCNC: 23 MMOL/L — SIGNIFICANT CHANGE UP (ref 22–31)
CREAT SERPL-MCNC: 1.12 MG/DL — SIGNIFICANT CHANGE UP (ref 0.5–1.3)
EGFR: 51 ML/MIN/1.73M2 — LOW
EOSINOPHIL # BLD AUTO: 0 K/UL — SIGNIFICANT CHANGE UP (ref 0–0.5)
EOSINOPHIL NFR BLD AUTO: 0 % — SIGNIFICANT CHANGE UP (ref 0–6)
GLUCOSE SERPL-MCNC: 678 MG/DL — CRITICAL HIGH (ref 70–99)
HCT VFR BLD CALC: 38.5 % — SIGNIFICANT CHANGE UP (ref 34.5–45)
HGB BLD-MCNC: 13.2 G/DL — SIGNIFICANT CHANGE UP (ref 11.5–15.5)
IANC: 9.57 K/UL — HIGH (ref 1.8–7.4)
IMM GRANULOCYTES NFR BLD AUTO: 0.3 % — SIGNIFICANT CHANGE UP (ref 0–0.9)
LYMPHOCYTES # BLD AUTO: 1.67 K/UL — SIGNIFICANT CHANGE UP (ref 1–3.3)
LYMPHOCYTES # BLD AUTO: 14.1 % — SIGNIFICANT CHANGE UP (ref 13–44)
MAGNESIUM SERPL-MCNC: 2.5 MG/DL — SIGNIFICANT CHANGE UP (ref 1.6–2.6)
MCHC RBC-ENTMCNC: 28.7 PG — SIGNIFICANT CHANGE UP (ref 27–34)
MCHC RBC-ENTMCNC: 34.3 G/DL — SIGNIFICANT CHANGE UP (ref 32–36)
MCV RBC AUTO: 83.7 FL — SIGNIFICANT CHANGE UP (ref 80–100)
MONOCYTES # BLD AUTO: 0.51 K/UL — SIGNIFICANT CHANGE UP (ref 0–0.9)
MONOCYTES NFR BLD AUTO: 4.3 % — SIGNIFICANT CHANGE UP (ref 2–14)
NEUTROPHILS # BLD AUTO: 9.57 K/UL — HIGH (ref 1.8–7.4)
NEUTROPHILS NFR BLD AUTO: 81 % — HIGH (ref 43–77)
NRBC # BLD: 0 /100 WBCS — SIGNIFICANT CHANGE UP (ref 0–0)
NRBC # FLD: 0 K/UL — SIGNIFICANT CHANGE UP (ref 0–0)
PHOSPHATE SERPL-MCNC: 3.2 MG/DL — SIGNIFICANT CHANGE UP (ref 2.5–4.5)
PLATELET # BLD AUTO: 274 K/UL — SIGNIFICANT CHANGE UP (ref 150–400)
POTASSIUM SERPL-MCNC: 5.3 MMOL/L — SIGNIFICANT CHANGE UP (ref 3.5–5.3)
POTASSIUM SERPL-SCNC: 5.3 MMOL/L — SIGNIFICANT CHANGE UP (ref 3.5–5.3)
PROT SERPL-MCNC: 8 G/DL — SIGNIFICANT CHANGE UP (ref 6–8.3)
RBC # BLD: 4.6 M/UL — SIGNIFICANT CHANGE UP (ref 3.8–5.2)
RBC # FLD: 11.6 % — SIGNIFICANT CHANGE UP (ref 10.3–14.5)
SODIUM SERPL-SCNC: 129 MMOL/L — LOW (ref 135–145)
WBC # BLD: 11.83 K/UL — HIGH (ref 3.8–10.5)
WBC # FLD AUTO: 11.83 K/UL — HIGH (ref 3.8–10.5)

## 2025-01-04 PROCEDURE — 99285 EMERGENCY DEPT VISIT HI MDM: CPT

## 2025-01-04 RX ORDER — SODIUM CHLORIDE 9 MG/ML
1000 INJECTION, SOLUTION INTRAMUSCULAR; INTRAVENOUS; SUBCUTANEOUS ONCE
Refills: 0 | Status: COMPLETED | OUTPATIENT
Start: 2025-01-04 | End: 2025-01-04

## 2025-01-04 RX ORDER — INSULIN LISPRO 100/ML
10 VIAL (ML) SUBCUTANEOUS ONCE
Refills: 0 | Status: COMPLETED | OUTPATIENT
Start: 2025-01-04 | End: 2025-01-04

## 2025-01-04 RX ADMIN — SODIUM CHLORIDE 1000 MILLILITER(S): 9 INJECTION, SOLUTION INTRAMUSCULAR; INTRAVENOUS; SUBCUTANEOUS at 21:12

## 2025-01-04 RX ADMIN — SODIUM CHLORIDE 1000 MILLILITER(S): 9 INJECTION, SOLUTION INTRAMUSCULAR; INTRAVENOUS; SUBCUTANEOUS at 23:43

## 2025-01-04 RX ADMIN — Medication 10 UNIT(S): at 23:42

## 2025-01-04 NOTE — ED PROVIDER NOTE - PROGRESS NOTE DETAILS
Temo Esposito DO (PGY-2) Patient not in DKA per VBG. Given insulin and IVF for hyperglycemia. No obvious infection. Patient to be placed in CDU for A1C 11 endo consult as she is not on any DM medications.

## 2025-01-04 NOTE — ED PROVIDER NOTE - PHYSICAL EXAMINATION
GENERAL: NAD  HEENT:  Atraumatic, Dry Oral Mucosa  CHEST/LUNG: Chest rise equal bilaterally cta b/l no w/r/r  HEART: Regular rate and rhythm  ABDOMEN: Soft, Nontender, Nondistended  EXTREMITIES:  Extremities warm  PSYCH: A&Ox3  SKIN: No obvious rashes or lesions  MSK: No cervical spine TTP, able to range neck to the left and right/ No midline spinal TTP/ No swelling, redness, pain or discharge from   NEUROLOGY: strength and sensation intact in all extremities. CN 2 - 12 intact. Finger to nose test intact. No pronator drift. Ambulatory without difficulty.

## 2025-01-04 NOTE — ED PROVIDER NOTE - NSICDXFAMILYHX_GEN_ALL_CORE_FT
FAMILY HISTORY:  Sibling  Still living? Yes, Estimated age: Age Unknown  FHx: throat cancer, Age at diagnosis: Age Unknown

## 2025-01-04 NOTE — ED ADULT NURSE NOTE - NSFALLUNIVINTERV_ED_ALL_ED
Bed/Stretcher in lowest position, wheels locked, appropriate side rails in place/Call bell, personal items and telephone in reach/Instruct patient to call for assistance before getting out of bed/chair/stretcher/Non-slip footwear applied when patient is off stretcher/[place] to call system/Physically safe environment - no spills, clutter or unnecessary equipment/Purposeful proactive rounding/Room/bathroom lighting operational, light cord in reach

## 2025-01-04 NOTE — ED ADULT TRIAGE NOTE - CHIEF COMPLAINT QUOTE
Patient had been feeling not so well and had dry mouth and was drinking lots of fluid. went to see PMD, had lab work yesterday and was told to go to ER for glucose over 800. Used to take diabetic medication but was stopped because it was under control.  FS: HI

## 2025-01-04 NOTE — ED PROVIDER NOTE - NSICDXPASTMEDICALHX_GEN_ALL_CORE_FT
PAST MEDICAL HISTORY:  Asthma     Fibroids     HLD (hyperlipidemia)     HTN (hypertension)     Palpitation     Ventricular premature depolarization

## 2025-01-04 NOTE — ED PROVIDER NOTE - CLINICAL SUMMARY MEDICAL DECISION MAKING FREE TEXT BOX
Patient is a 74-year-old female, accompanied by daughter, past medical history hypertension, hyperlipidemia, DM 2 (was previously on metformin however stopped it as her sugars were well controlled) presents for CC "high blood sugar". Patient had been feeling not so well and had dry mouth and was drinking lots of fluid. went to see PMD, had lab work yesterday and was told to go to ER for glucose over 800. Patient denies any fevers, chills, nausea, vomiting, chest pain, diarrhea, constipation, painful urination, new rashes.   Medications at home include atorvastatin, diltiazem, valsartan  Vital signs nonactionable  On exam patient is well-appearing with dry mucosa.  She is ANO x 3 and moving all extremity spontaneously.  Differential diagnosis includes is not limited to hyperglycemia versus DKA and patient no longer taking diabetes medications.  Will obtain labs to eval for DKA, give fluids.

## 2025-01-04 NOTE — ED ADULT NURSE NOTE - LAST KNOWN WELL DATE/TIME
[facility] ambulatory encounter    URGENT CARE INITIAL EVALUATION    CHIEF COMPLAINT:    Chief Complaint   Patient presents with   • Vomiting     since 1030 last night   • Abdominal Pain       SUBJECTIVE:  Dejon Baum is a 3 year old male, who presents with a complaint of vomiting since yesterday. Patient had 6 episodes of vomiting last night. No episodes of vomiting today. Emesis has been non-bloody and non-bilious, it has been mostly watery and food. He reports cramping abdominal pain. No diarrhea. No f/c. + body aches and fatigue. He denies sore throat, ear pain, nasal congestion, headache, dizziness, and lightheaded. He has been drinking some water today and minimal food .      REVIEW OF SYSTEMS:    Review of Systems   A review of systems was performed and findings relevant to this complaint are included in the HPI.      HISTORIES:  ALLERGIES:  No Known Allergies    MEDICATIONS:  No current outpatient medications on file.     No current facility-administered medications for this visit.      History reviewed. No pertinent past medical history.  History reviewed. No pertinent surgical history.  Social History     Socioeconomic History   • Marital status: Single     Spouse name: None   • Number of children: None   • Years of education: None   • Highest education level: None   Social Needs   • Financial resource strain: None   • Food insecurity - worry: None   • Food insecurity - inability: None   • Transportation needs - medical: None   • Transportation needs - non-medical: None   Occupational History   • None   Tobacco Use   • Smoking status: None   Substance and Sexual Activity   • Alcohol use: None   • Drug use: None   • Sexual activity: None   Other Topics Concern   • None   Social History Narrative   • None     Social History     Tobacco Use   Smoking Status Not on file     History reviewed. No pertinent family history.    OBJECTIVE:  PHYSICAL EXAM:  Visit Vitals  Pulse 96   Temp 98.2 °F (36.8 °C)   Ht 4' 0.5\"  (1.232 m)   Wt 21.8 kg   SpO2 96%   BMI 14.35 kg/m²     General:  Well hydrated male who appears in no acute distress.  Eyes:  No jaundice, injection or drainage.  Nose: clear discharge   Oral Cavity:  No trismus.  Good dentition.  No lesions of gums or hard palate. + pharyngeal erythema  Neck:  No submandibular and cervical lymph nodes.  Supple.  Lungs: Clear to auscultation bilaterally.No wheezes, rales or rhonchi noted.   Abdomen:  Soft.  Nontender. + Hyperactive bowel sounds   Neuro: A&Ox3  Psych: Cooperative     ASSESSMENT & PLAN:  Dejon was seen today for vomiting and abdominal pain.    Diagnoses and all orders for this visit:    Viral gastroenteritis  - He was given PO fluids in clinic and he was able to tolerate 180ml.   - reassurance given to mom   - Supportive care: push fluids (gatorade or pedialyte), alternate tylenol 300mg every 4 hours with Ibuprofen 200mg every 4 hours for body aches  - discussed red flag signs and when to RTC              The patient was advised to follow up with primary physician or to recheck with the urgent care clinic sooner if symptoms get worse or if new symptoms appear.       04-Jan-2025 18:17

## 2025-01-04 NOTE — ED ADULT NURSE NOTE - OBJECTIVE STATEMENT
Patient received to room # 19, A&OX4, ambulatory, English speaking, hx. of diabetes and HTN, accompanied by daughter at bedside, coming to the ED for c/o of elevated blood glucose level (800). Patient states she was sent to the ED by PMD for high glucose level drawn yesterday. Patient denies fever, chills, chest pain, sob, abdominal pain, vomiting, or diarrhea. Patient endorses headache, dizziness, blurry vision, nausea, excessive thirst, and urinary frequency. Patient states the urinary frequency and excessive thirst began about two weeks ago when provider changed her Diltiazem dose. Patient states that she is an international resident and spends half the year in [place] which has caused her to be noncompliant with her medication adherence.  Respirations even and unlabored, abdomen soft, non distended, non tender, no neuro deficits noted. Care plan continued, comfort measures provided, safety maintained.  Awaiting orders per MD.

## 2025-01-04 NOTE — ED PROVIDER NOTE - ATTENDING CONTRIBUTION TO CARE
I performed a face-to-face evaluation of the patient and performed a history and physical examination. I agree with the history and physical examination. If this was a PA visit, I personally saw the patient with the PA and performed a substantive portion of the visit including all aspects of the medical decision making.    H/o DM. Taken off meds by her MD in [place]. C/o polydipsia and polyuria and dry mouth. BG elevated. Check DKA labs. Give IVF and insulin. If not DKA, then dc on Janumet (has Medicare) and f/u w/ her PCP.

## 2025-01-05 DIAGNOSIS — E11.65 TYPE 2 DIABETES MELLITUS WITH HYPERGLYCEMIA: ICD-10-CM

## 2025-01-05 DIAGNOSIS — E78.5 HYPERLIPIDEMIA, UNSPECIFIED: ICD-10-CM

## 2025-01-05 DIAGNOSIS — I10 ESSENTIAL (PRIMARY) HYPERTENSION: ICD-10-CM

## 2025-01-05 LAB
A1C WITH ESTIMATED AVERAGE GLUCOSE RESULT: 11 % — HIGH (ref 4–5.6)
ANION GAP SERPL CALC-SCNC: 10 MMOL/L — SIGNIFICANT CHANGE UP (ref 7–14)
APPEARANCE UR: CLEAR — SIGNIFICANT CHANGE UP
B-OH-BUTYR SERPL-SCNC: 0.2 MMOL/L — SIGNIFICANT CHANGE UP (ref 0–0.4)
BILIRUB UR-MCNC: NEGATIVE — SIGNIFICANT CHANGE UP
BUN SERPL-MCNC: 30 MG/DL — HIGH (ref 7–23)
CALCIUM SERPL-MCNC: 8.4 MG/DL — SIGNIFICANT CHANGE UP (ref 8.4–10.5)
CHLORIDE SERPL-SCNC: 104 MMOL/L — SIGNIFICANT CHANGE UP (ref 98–107)
CO2 SERPL-SCNC: 23 MMOL/L — SIGNIFICANT CHANGE UP (ref 22–31)
COLOR SPEC: ABNORMAL
CREAT SERPL-MCNC: 0.85 MG/DL — SIGNIFICANT CHANGE UP (ref 0.5–1.3)
DIFF PNL FLD: NEGATIVE — SIGNIFICANT CHANGE UP
EGFR: 71 ML/MIN/1.73M2 — SIGNIFICANT CHANGE UP
ESTIMATED AVERAGE GLUCOSE: 269 — SIGNIFICANT CHANGE UP
GLUCOSE SERPL-MCNC: 368 MG/DL — HIGH (ref 70–99)
GLUCOSE UR QL: >=1000 MG/DL
KETONES UR-MCNC: 15 MG/DL
LEUKOCYTE ESTERASE UR-ACNC: NEGATIVE — SIGNIFICANT CHANGE UP
MAGNESIUM SERPL-MCNC: 2.1 MG/DL — SIGNIFICANT CHANGE UP (ref 1.6–2.6)
NITRITE UR-MCNC: NEGATIVE — SIGNIFICANT CHANGE UP
PH UR: 6 — SIGNIFICANT CHANGE UP (ref 5–8)
PHOSPHATE SERPL-MCNC: 2.1 MG/DL — LOW (ref 2.5–4.5)
POTASSIUM SERPL-MCNC: 3.7 MMOL/L — SIGNIFICANT CHANGE UP (ref 3.5–5.3)
POTASSIUM SERPL-SCNC: 3.7 MMOL/L — SIGNIFICANT CHANGE UP (ref 3.5–5.3)
PROT UR-MCNC: SIGNIFICANT CHANGE UP MG/DL
SODIUM SERPL-SCNC: 137 MMOL/L — SIGNIFICANT CHANGE UP (ref 135–145)
SP GR SPEC: 1.03 — SIGNIFICANT CHANGE UP (ref 1–1.03)
UROBILINOGEN FLD QL: 0.2 MG/DL — SIGNIFICANT CHANGE UP (ref 0.2–1)

## 2025-01-05 PROCEDURE — 99285 EMERGENCY DEPT VISIT HI MDM: CPT

## 2025-01-05 PROCEDURE — 99223 1ST HOSP IP/OBS HIGH 75: CPT

## 2025-01-05 PROCEDURE — 93010 ELECTROCARDIOGRAM REPORT: CPT

## 2025-01-05 RX ORDER — VALSARTAN 80 MG/1
320 TABLET ORAL DAILY
Refills: 0 | Status: DISCONTINUED | OUTPATIENT
Start: 2025-01-05 | End: 2025-01-08

## 2025-01-05 RX ORDER — INSULIN GLARGINE-YFGN 100 [IU]/ML
13 INJECTION, SOLUTION SUBCUTANEOUS ONCE
Refills: 0 | Status: COMPLETED | OUTPATIENT
Start: 2025-01-05 | End: 2025-01-05

## 2025-01-05 RX ORDER — INSULIN LISPRO 100/ML
4 VIAL (ML) SUBCUTANEOUS
Refills: 0 | Status: DISCONTINUED | OUTPATIENT
Start: 2025-01-05 | End: 2025-01-05

## 2025-01-05 RX ORDER — DEXTROSE MONOHYDRATE 25 G/50ML
15 INJECTION, SOLUTION INTRAVENOUS ONCE
Refills: 0 | Status: DISCONTINUED | OUTPATIENT
Start: 2025-01-05 | End: 2025-01-08

## 2025-01-05 RX ORDER — INSULIN GLARGINE-YFGN 100 [IU]/ML
13 INJECTION, SOLUTION SUBCUTANEOUS
Refills: 0 | Status: DISCONTINUED | OUTPATIENT
Start: 2025-01-06 | End: 2025-01-06

## 2025-01-05 RX ORDER — DILTIAZEM HYDROCHLORIDE 300 MG/1
240 CAPSULE, COATED, EXTENDED RELEASE ORAL DAILY
Refills: 0 | Status: DISCONTINUED | OUTPATIENT
Start: 2025-01-05 | End: 2025-01-08

## 2025-01-05 RX ORDER — SODIUM CHLORIDE 9 MG/ML
1000 INJECTION, SOLUTION INTRAVENOUS
Refills: 0 | Status: DISCONTINUED | OUTPATIENT
Start: 2025-01-05 | End: 2025-01-08

## 2025-01-05 RX ORDER — INSULIN LISPRO 100/ML
7 VIAL (ML) SUBCUTANEOUS
Refills: 0 | Status: DISCONTINUED | OUTPATIENT
Start: 2025-01-05 | End: 2025-01-06

## 2025-01-05 RX ORDER — GLUCAGON INJECTION, SOLUTION 0.5 MG/.1ML
1 INJECTION, SOLUTION SUBCUTANEOUS ONCE
Refills: 0 | Status: DISCONTINUED | OUTPATIENT
Start: 2025-01-05 | End: 2025-01-08

## 2025-01-05 RX ORDER — DEXTROSE MONOHYDRATE 25 G/50ML
25 INJECTION, SOLUTION INTRAVENOUS ONCE
Refills: 0 | Status: DISCONTINUED | OUTPATIENT
Start: 2025-01-05 | End: 2025-01-08

## 2025-01-05 RX ORDER — INSULIN LISPRO 100/ML
VIAL (ML) SUBCUTANEOUS
Refills: 0 | Status: DISCONTINUED | OUTPATIENT
Start: 2025-01-05 | End: 2025-01-08

## 2025-01-05 RX ORDER — ATORVASTATIN CALCIUM 40 MG/1
20 TABLET, FILM COATED ORAL AT BEDTIME
Refills: 0 | Status: DISCONTINUED | OUTPATIENT
Start: 2025-01-05 | End: 2025-01-08

## 2025-01-05 RX ORDER — INSULIN LISPRO 100/ML
VIAL (ML) SUBCUTANEOUS AT BEDTIME
Refills: 0 | Status: DISCONTINUED | OUTPATIENT
Start: 2025-01-05 | End: 2025-01-08

## 2025-01-05 RX ORDER — DEXTROSE MONOHYDRATE 25 G/50ML
12.5 INJECTION, SOLUTION INTRAVENOUS ONCE
Refills: 0 | Status: DISCONTINUED | OUTPATIENT
Start: 2025-01-05 | End: 2025-01-08

## 2025-01-05 RX ADMIN — VALSARTAN 320 MILLIGRAM(S): 80 TABLET ORAL at 06:44

## 2025-01-05 RX ADMIN — Medication 3: at 12:05

## 2025-01-05 RX ADMIN — Medication 4 UNIT(S): at 07:48

## 2025-01-05 RX ADMIN — Medication 4: at 07:48

## 2025-01-05 RX ADMIN — DILTIAZEM HYDROCHLORIDE 240 MILLIGRAM(S): 300 CAPSULE, COATED, EXTENDED RELEASE ORAL at 06:44

## 2025-01-05 RX ADMIN — ATORVASTATIN CALCIUM 20 MILLIGRAM(S): 40 TABLET, FILM COATED ORAL at 22:25

## 2025-01-05 RX ADMIN — Medication 4 UNIT(S): at 17:43

## 2025-01-05 RX ADMIN — Medication 4 UNIT(S): at 12:05

## 2025-01-05 RX ADMIN — Medication 4: at 17:43

## 2025-01-05 RX ADMIN — INSULIN GLARGINE-YFGN 13 UNIT(S): 100 INJECTION, SOLUTION SUBCUTANEOUS at 07:52

## 2025-01-05 NOTE — ED CDU PROVIDER INITIAL DAY NOTE - CLINICAL SUMMARY MEDICAL DECISION MAKING FREE TEXT BOX
74-year-old female, accompanied by daughter, past medical history hypertension, hyperlipidemia, DM 2 (was previously on metformin however stopped it as her sugars were well controlled) presents for CC "high blood sugar". Patient had been feeling not so well and had dry mouth and was drinking lots of fluid. went to see PMD, had lab work yesterday and was told to go to ER for glucose over 800. Patient denies any fevers, chills, nausea, vomiting, chest pain, diarrhea, constipation, painful urination, new rashes. In ER initial glucose 678, beta hydroxybutyrate 1.9, A1c 11, VBG with no evidence of acidosis.  Patient was given 2 L of IV fluids, and add him along 10 units with improvement of glucose of 368 and beta hydroxybutyrate of 0.2.  Patient started on weight-based insulin regiment and placed in CDU for endocrine consult. 74-year-old female, accompanied by daughter, past medical history hypertension, hyperlipidemia, DM 2 (was previously on metformin however stopped it as her sugars were well controlled) presents for CC "high blood sugar". Patient had been feeling not so well and had dry mouth and was drinking lots of fluid. went to see PMD, had lab work yesterday and was told to go to ER for glucose over 800. Patient denies any fevers, chills, nausea, vomiting, chest pain, diarrhea, constipation, painful urination, new rashes. In ER initial glucose 678, beta hydroxybutyrate 1.9, A1c 11, VBG with no evidence of acidosis.  Patient was given 2 L of IV fluids, and admelog 10 units with improvement of glucose of 368 and beta hydroxybutyrate of 0.2.  Patient started on weight-based insulin regiment and placed in CDU for endocrine consult.

## 2025-01-05 NOTE — ED CDU PROVIDER INITIAL DAY NOTE - ATTENDING APP SHARED VISIT CONTRIBUTION OF CARE
I was physically present for the E/M service provided. I agree with above history, physical, and plan which I have reviewed and edited where appropriate. I was physically present for the key portions of the service provided. Harris Anthony DO:  patient seen and evaluated with the PA.  I was present for key portions of the History & Physical, and I agree with the Impression & Plan. 73 yo f pmh HTN, HLD, DM2, s/p cardiac ablation 2023 , pw hyperglycemia. w/u reviewed. cdu for dm management, and endo eval.

## 2025-01-05 NOTE — CONSULT NOTE ADULT - ATTENDING COMMENTS
74 yr old female with PMHx of HTN, HLD, T2DM who presented with hyperglycemia. Patient had stopped taking medications for diabetes a couple of years ago and stopped monitoring BG. Found to have BG>800, A1c 11%. Endocrine team consulted for uncontrolled diabetes. Patient is high risk with high level decision making due to uncontrolled diabetes which places patient at high risk for cardiovascular and cerebrovascular events. Patient with lability of glucose requiring close monitoring and insulin adjustments. Titrating basal bolus insulin regimen as discussed in plan above. Discussed importance of carb consistent diet, avoiding sugary beverages. For discharge, consider basal insulin with metformin.

## 2025-01-05 NOTE — CONSULT NOTE ADULT - ASSESSMENT
74F PMH HTN, HLD, DM2, s/p cardiac ablation 2023 presents with significant hyperglycemia on OP labs. Patient reports 1 week of dry mouth, polyuria/polydipsia and fatigue after recent increase in her diltiazem dosing. Went to new PCP at [facility] who obtained labs and referred patient urgently to ED for BG >800. In ED, initial , BHB 1.9, A1c 11%, with no evidence of acidosis. Patient was given 2L of IV fluids and admelog 10 units x1 with improvement of glucose to 368 and BHB to 0.2.  Patient started on weight-based insulin regimen in CDU. Endocrine consulted for further management.     #Uncontrolled T2DM  - A1c 11%  - Off of metformin x 5 years, not on meds at present  - Follows with  - Started on Lantus 13 units x1 at 07h52 and Admelog 4 units tidac + low Admelog scale ac/hs    PLAN  While inpatient:  - c/w Lantus 13 units QHS   - increase Admelog to 7 units TIDAC (HOLD if NPO)  - c/w low dose correctional insulin with meals and low dose correctional insulin at bedtime  - Check FS before meals and at bedtime - if NPO, check q6 hours  - Goal -180  - Appreciate nutrition consult  - Please obtain lipid panel in AM     Discharge Recommendations:  - Will send on basal insulin (dose TBD) + metformin 500 mg BID to be uptitrated to 1000 mg BID after 1 week  - Please have RN provide insulin teaching at bedside  - Please send:  -- Dexcom G7 sensor and reader  -- Glucometer (ACCU_CHECK robert Connect, Ascensia Contour Next EZ or One, Freestyle Harwood LITE or OneTouch Verio IQ)  -- Glucometer test strips and lancets  (make sure compatible with glucometer), dispense #100 (or #200) use as directed, alcohol pads  -- BD jono 4mm pen needles  -- Glucose tabs, Baqsimi nasal spray or glucagon emergency kit for hypoglycemia risk   - If patient is not covered for CGM, patient should check FSBG premeals and bedtime. Patient should call their doctor when FSBG <70 or above >400 and or consistently above 200s as changes in the regimen will have to be made.   - Patient can follow up at [facility] ([phone]) at [address], [place] [address] - I will reach out to our coordinator to assist patient in scheduling appt  - Routine follow-up with Ophthalmology and Podiatry     #HTN  - Goal BP <130/80  - Management per primary team  - UACR as outpatient    #HLD  - Goal LDL <70  - Obtain lipid panel in AM    D/w primary team    Mayra Allison MD  Endocrinology Fellow  Can be reached via Microsoft Teams    For follow up questions, discharge recommendations, or new consults, please email [email or web address] or [email or web address] ([facility]) or call answering service at [phone] (weekdays); [phone] (nights/weekends).  For emergencies please page fellow on call.

## 2025-01-05 NOTE — ED ADULT NURSE REASSESSMENT NOTE - NS ED NURSE REASSESS COMMENT FT1
Pt is resting comfortably in the stretcher. No complaints made at this time. Admitted to medicine for hyperglycemia.  Report given to CDU nurse.  Pending transportation.
Received pt in stable condition.  Pt is alert and oriented x4.  No complaints made at this time. Respiration even and non-labored.  Pending blood work.  Pt made comfortable. All safety precautions maintained.
Break RN: Received report from MARGE Wellington. Pt is A&Ox4, resting in stretcher with no complaints at this time. Respirations even and unlabored, chest rise equal b/l. VS as noted in flow sheets. Pt denies chest pain, SOB, abdominal pain, N/V/D, h/a, dizziness, numbness/tingling or any urinary symptoms at this time. No acute distress noted. Lab collected and sent. Safety maintained throughout.
Pt received from main ED, was oriented to room and unit, call bell provided and is within reach,   Pt denies any pain, SOB. call bell provided, Plan is for Pt to be seen by the endocrinologist. DM teaching done   Pt educated on insulin pen administration, hypoglycemia and treatment, diet, exercise, and blood glucose monitoring, Pt with good return demo and verbalized understanding.   Pt was provided written materials: Diabetes Leaving the Hospital, Hypoglycemia, Know your A1C and eAG, Food list for Consistent Carbohydrate Meal Plan, and My Plate Planner. all information was reviewed with Pt. Pt voiced understanding.

## 2025-01-05 NOTE — CONSULT NOTE ADULT - SUBJECTIVE AND OBJECTIVE BOX
Mayra Allison MD   |   PGY-4  Endocrinology Fellow  Available on Microsoft Teams    HPI:  74-year-old female PMH HTN, HLD, DM2, s/p cardiac ablation 2023 presents with significant hyperglycemia on OP labs. Patient reports 1 week of dry mouth, polyuria/polydipsia and fatigue after recent increase in her diltiazem dosing. Went to new PCP at [facility] who obtained labs and referred patient urgently to ED for BG >800. In ED, initial , BHB 1.9, A1c 11%, with no evidence of acidosis. Patient was given 2L of IV fluids and admelog 10 units x1 with improvement of glucose to 368 and BHB to 0.2.  Patient started on weight-based insulin regimen in CDU. Endocrine consulted for further management.     Endocrine History:    DM2 dx ~20 years ago  Patient lives in [place], [place] frequently. Was being followed by PCP in [place] and taken off metformin after good control of BGs ~5 years ago. Denies ever having side effects to metformin  Reports A1c obtained 4 months ago with PCP was normal  Does not check BG at home  Denies neuropathy, nephropathy, retinopathy. Denies h/o CVA or CAD.   States her diet is more carb heavy than with protein or vegetables. Drinks a lot of sugar drinks such as Snapple or juice.  Walks a lot   DM in brother. No known MTC or MEN2      PAST MEDICAL & SURGICAL HISTORY:  Palpitation      HTN (hypertension)      HLD (hyperlipidemia)      Asthma      Fibroids      Ventricular premature depolarization      H/O: hysterectomy          FAMILY HISTORY:  DM in brother    SOCIAL HISTORY:  Lives in [place], visits often      MEDICATIONS  (STANDING):  atorvastatin 20 milliGRAM(s) Oral at bedtime  dextrose 5%. 1000 milliLiter(s) (100 mL/Hr) IV Continuous <Continuous>  dextrose 5%. 1000 milliLiter(s) (50 mL/Hr) IV Continuous <Continuous>  dextrose 50% Injectable 25 Gram(s) IV Push once  dextrose 50% Injectable 12.5 Gram(s) IV Push once  dextrose 50% Injectable 25 Gram(s) IV Push once  diltiazem    milliGRAM(s) Oral daily  glucagon  Injectable 1 milliGRAM(s) IntraMuscular once  hydrochlorothiazide 25 milliGRAM(s) Oral daily  insulin lispro (ADMELOG) corrective regimen sliding scale   SubCutaneous three times a day before meals  insulin lispro (ADMELOG) corrective regimen sliding scale   SubCutaneous at bedtime  insulin lispro Injectable (ADMELOG) 7 Unit(s) SubCutaneous three times a day before meals  valsartan 320 milliGRAM(s) Oral daily    MEDICATIONS  (PRN):  dextrose Oral Gel 15 Gram(s) Oral once PRN Blood Glucose LESS THAN 70 milliGRAM(s)/deciliter      Allergies    codeine (Hives)    Intolerances      Review of Systems:  Constitutional: No fever  Eyes: No blurry vision  Neuro: No tremors, numbness/tingling  HEENT: No pain, dysphagia, dysphonia  Cardiovascular: No chest pain, palpitations  Respiratory: No SOB  GI: No nausea, vomiting, abdominal pain  : No dysuria  Skin: no rash  Psych: no depression  Endocrine: no polyuria, polydipsia  Hem/lymph: no swelling  Osteoporosis: no fractures    ===================PHYSICAL EXAM=======================  VITALS: T(C): 36.4 (01-05-25 @ 13:44)  T(F): 97.6 (01-05-25 @ 13:44), Max: 99 (01-04-25 @ 19:40)  HR: 68 (01-05-25 @ 13:44) (61 - 78)  BP: 114/69 (01-05-25 @ 13:44) (107/59 - 153/67)  RR:  (16 - 18)  SpO2:  (97% - 100%)  Wt(kg): --  GENERAL: NAD  EYES: No proptosis, no lid lag, anicteric  THYROID: Normal size, no palpable nodules  RESPIRATORY: Clear to auscultation bilaterally  CARDIOVASCULAR: Regular rate and rhythm  GI: Soft, nontender, non distended  EXT: b/l feet without wounds, 2+ pulses, no edema  PSYCH: Alert and oriented x 3, reactive mood  ======================================================  POCT Blood Glucose.: 329 mg/dL (01-05-25 @ 16:58)  POCT Blood Glucose.: 290 mg/dL (01-05-25 @ 11:27)  POCT Blood Glucose.: 331 mg/dL (01-05-25 @ 07:24)  POCT Blood Glucose.: 311 mg/dL (01-05-25 @ 04:53)  POCT Blood Glucose.: 536 mg/dL (01-04-25 @ 23:41)  POCT Blood Glucose.: >600 mg/dL (01-04-25 @ 18:15)                            13.2   11.83 )-----------( 274      ( 04 Jan 2025 22:13 )             38.5       01-05    137  |  104  |  30[H]  ----------------------------<  368[H]  3.7   |  23  |  0.85    eGFR: 71    Ca    8.4      01-05  Mg     2.10     01-05  Phos  2.1     01-05    TPro  8.0  /  Alb  4.6  /  TBili  0.4  /  DBili  x   /  AST  14  /  ALT  9   /  AlkPhos  100  01-04      Thyroid Function Tests:      A1C with Estimated Average Glucose Result: 11.0 % (01-04-25 @ 23:50)          Radiology:

## 2025-01-05 NOTE — ED CDU PROVIDER INITIAL DAY NOTE - PROGRESS NOTE DETAILS
sitting up in bed with no complaints  states she has had increased thirst and urination   stopped metformin 5 years ago because sugar was normal JENIFER Zepeda: Pt resting comfortably in bed, no acute distress.  Patient seen and evaluated by endocrine attending Dr. Null advised to continue Lantus 13 units and increase Premeal to 7 units.  Per Dr. Null will have final recs in a.m wants to continue to monitor Glucose levels.  Patient aware and agreeable plan.

## 2025-01-06 VITALS
OXYGEN SATURATION: 99 % | HEART RATE: 80 BPM | TEMPERATURE: 98 F | RESPIRATION RATE: 18 BRPM | SYSTOLIC BLOOD PRESSURE: 151 MMHG | DIASTOLIC BLOOD PRESSURE: 78 MMHG

## 2025-01-06 PROCEDURE — 99239 HOSP IP/OBS DSCHRG MGMT >30: CPT

## 2025-01-06 PROCEDURE — 99214 OFFICE O/P EST MOD 30 MIN: CPT

## 2025-01-06 RX ORDER — INSULIN LISPRO 100/ML
10 VIAL (ML) SUBCUTANEOUS
Refills: 0 | Status: DISCONTINUED | OUTPATIENT
Start: 2025-01-06 | End: 2025-01-08

## 2025-01-06 RX ORDER — METFORMIN 850 MG/1
1 TABLET ORAL
Qty: 60 | Refills: 0
Start: 2025-01-06

## 2025-01-06 RX ORDER — ISOPROPYL ALCOHOL, BENZOCAINE .7; .06 ML/ML; ML/ML
0 SWAB TOPICAL
Qty: 100 | Refills: 1
Start: 2025-01-06

## 2025-01-06 RX ORDER — INSULIN GLARGINE-YFGN 100 [IU]/ML
16 INJECTION, SOLUTION SUBCUTANEOUS
Qty: 5 | Refills: 0
Start: 2025-01-06

## 2025-01-06 RX ORDER — INSULIN GLARGINE-YFGN 100 [IU]/ML
16 INJECTION, SOLUTION SUBCUTANEOUS
Refills: 0 | Status: DISCONTINUED | OUTPATIENT
Start: 2025-01-07 | End: 2025-01-08

## 2025-01-06 RX ADMIN — VALSARTAN 320 MILLIGRAM(S): 80 TABLET ORAL at 06:46

## 2025-01-06 RX ADMIN — INSULIN GLARGINE-YFGN 13 UNIT(S): 100 INJECTION, SOLUTION SUBCUTANEOUS at 07:46

## 2025-01-06 RX ADMIN — Medication 7 UNIT(S): at 11:54

## 2025-01-06 RX ADMIN — Medication 7 UNIT(S): at 07:46

## 2025-01-06 RX ADMIN — Medication 3: at 07:47

## 2025-01-06 RX ADMIN — Medication 3: at 11:53

## 2025-01-06 RX ADMIN — DILTIAZEM HYDROCHLORIDE 240 MILLIGRAM(S): 300 CAPSULE, COATED, EXTENDED RELEASE ORAL at 06:45

## 2025-01-06 NOTE — ED CDU PROVIDER DISPOSITION NOTE - NSFOLLOWUPINSTRUCTIONS_ED_ALL_ED_FT
send prescription for glucose tablets 4G (take 4 tablets) or 15G tablets for blood sugar less than 70 mG/dL, repeat fingerstick in 15 minutes. Call your provider for dose adjustment if needed.  - follow up with [facility] at [place]:   [address]  [place] [address]  [phone] Follow-up with your primary care doctor within 1 week  Follow up with an endocrinologist, [facility] at [place]:   [address], [place] [address], [phone] please call to make an appointment     For your diabetes take the following medications:  Take Lantus 16 units once a day in the morning  Take metformin 500 mg (1 tablet) twice a day for 7 days, then increase to 2 tablets twice a day  Check your blood sugar 4 times a day before meals and before bedtime, keep a record of your results to bring to your follow-up appointment  Return to the ER with any worsening or concerning symptoms, abdominal pain, nausea, vomiting, dizziness, weakness or any other concerns.    Take glucose tablets for blood sugar less than 70 mG/dL, repeat fingerstick in 15 minutes. Call your provider for dose adjustment if needed.

## 2025-01-06 NOTE — DISCHARGE NOTE NURSING/CASE MANAGEMENT/SOCIAL WORK - NSDCPEFALRISK_GEN_ALL_CORE
For information on Fall & Injury Prevention, visit: [email or web address] OR  [email or web address] OR  https://www.cdc.gov/steadi/patient.html

## 2025-01-06 NOTE — PROGRESS NOTE ADULT - ASSESSMENT
74F PMH HTN, HLD, DM2, s/p cardiac ablation 2023 presents with significant hyperglycemia on OP labs. Patient reports 1 week of dry mouth, polyuria/polydipsia and fatigue after recent increase in her diltiazem dosing. She went to new PCP at [facility] who obtained labs and referred patient urgently to ED for BG >800. In ED, initial , BHB 1.9, A1c 11%, with no evidence of acidosis. Patient was given 2L of IV fluids and admelog 10 units x1 with improvement of glucose to 368 and BHB to 0.2.  Patient started on weight-based insulin regimen in CDU. Endocrine consulted for further management.     Uncontrolled T2DM  - A1c 11%  - Off of metformin x 5 years, not on meds at present  - Follows with PCP      Inpatient plan   - FS goal 100-180 mg/dl   - FS above goal   - Increase Lantus to 16 units daily in the morning (per patient preference)  - Increase Admelog to 10 units TID AC. Hold if not eating/NPO.   - continue low Admelog correctional scale TID AC  - continue separate low Admelog correctional scale at HS  - FS TID AC & HS ---> q6 if NPO   - hypoglycemia protocol PRN   - consistent carbohydrate diet  - RD consult  - Provider to RN - insulin pen teaching    Discharge plan   - Discharge on: Lantus Solostar pen (or equivalent basal insulin) 16 units daily in the morning + metformin 500 mg PO BID for 7 days then increase to 1000 mg PO BID.   - please send prescription for glucometer and supplies - test strip, lancets, alcohol pads and insulin pen needles.  - Please send prescription for glucose tablets 4G (take 4 tablets) or 15G tablets for blood sugar less than 70 mG/dL, repeat fingerstick in 15 minutes. Call your provider for dose adjustment if needed.  - follow up with [facility] at [place]:   [address]  [place] [address]  [phone]   (office made aware to call the patient for appointment on 01/05/2025)          HTN  - Goal BP <130/80  - continue diltiazem, hydrochlorothiazide, valsartan  - Management per primary team  - UACR as outpatient    HLD  - Goal LDL <70  - continue statin   - Check lipid panel as outpatient  - management per primary team         Albertina Alonzo, [facility]-BC  Nurse Practitioner  Division of Endocrinology & Diabetes  available via [employer] Teams  Is This A New Presentation, Or A Follow-Up?: Rash

## 2025-01-06 NOTE — CHART NOTE - NSCHARTNOTEFT_GEN_A_CORE
CDU BED # 12  CM met patient at bedside to discuss discharge plan and explained CM role as discharge planner. Offered Home Care Service. Patient chose [facility] home care services. Pt will be going home  on new insulin and medication for her diabetes. AIC 11. Pt had stop taking her metformin five years ago as per MD.  Pt lives with daughter when she is the [place]. She goes back and forth to [place].  Pt is independent with ambulations and ADL's.  Pt does not have a glucometer machine at home.  Teaching will be done prior to going home later. [facility] accepted the case. Projected date for 1/7/2025.  Her daughter-Lisa will provide transportation home later.  [facility] will continue to collaborate with the interdisciplinary team to provide a safe discharge plan. CDU BED # 12  CM met patient at bedside to discuss discharge plan and explained CM role as discharge planner. Offered Home Care Service. Patient chose [facility] home care services. Pt will be going home  on new insulin and medication for her diabetes. AIC 11. Pt had stop taking her metformin five years ago as per MD.  Pt lives with daughter when she is [place]. She goes back and forth to [place].  Pt is independent with ambulations and ADL's.  Pt does not have a glucometer machine at home.  Teaching will be done prior to going home later.  Patient will be pick-up her medication from [facility] pharmacy. Pt made aware of her deductible payment amount of $ 160.00 for the new year and in agreement.  [facility] accepted the case. Projected date for 1/7/2025.  Her daughter-Lisa will provide transportation home later.  [facility] team to provide a safe discharge plan.

## 2025-01-06 NOTE — DISCHARGE NOTE NURSING/CASE MANAGEMENT/SOCIAL WORK - FINANCIAL ASSISTANCE
[facility] provides services at a reduced cost to those who are determined to be eligible through [facility]’s financial assistance program. Information regarding [facility]’s financial assistance program can be found by going to [email or web address] or by calling [phone].

## 2025-01-06 NOTE — ED CDU PROVIDER DISPOSITION NOTE - NS_CDULENGTHOFSTAY_ED_A_ED
[facility] Provider Note    Visit Date: 3/19/2023     Subjective     17 year old female presents to the [facility] with the following complaint:  Chief Complaint   Patient presents with   • Toe Pain     Bilateral great toe pain/swelling/ discharge/ had 2 ingrown toenails removed 3/16/2023       HPI:  This was a 17 years old female presented with bilateral great toe erythematous and swelling with active drainage in the last a few days.  He was seen by the podiatrist a couple of days ago and had a partial toenail removal bilaterally.  She is not take any antibiotics.       ALLERGIES:  ALLERGIES:  No Known Allergies    MEDICATIONS:  Current Outpatient Medications   Medication Sig   • diphenoxylate-atropine (LOMOTIL) 2.5-0.025 MG tablet    • insulin lispro (HumaLOG KwikPen) 200 UNIT/ML pen-injector 200 u per day with insulin pump   • prochlorperazine (COMPAZINE) 5 MG tablet Take 5 mg by mouth every 8 hours as needed.   • sertraline (ZOLOFT) 100 MG tablet Take 1 tablet by mouth daily.   • famotidine (PEPCID) 40 MG tablet Take 40 mg by mouth nightly.   • loperamide (IMODIUM) 2 MG capsule Take 2 mg by mouth daily as needed for Diarrhea.   • hydrOXYzine (ATARAX) 25 MG tablet Take 50 mg by mouth daily as needed for Anxiety.   • insulin aspart (NovoLOG) 100 unit/mL subcutaneous pump by Subcutaneous Infusion route continuous. Patient-managed pump  Pump Brand: Omnipod  Basal rate: units/hr (unknown to patient- determined by the pump/glucose monitor)  Bolus pre-meal doses: per patient every 2 carbs 1 unit of insulin with max single dose of 30 units and max daily dose of 200 units   • sertraline (ZOLOFT) 50 MG tablet Take 75 mg by mouth daily (with breakfast). Takes total of 75 mg tablet   • pantoprazole (PROTONIX) 40 MG tablet Take 40 mg by mouth in the morning and 40 mg in the evening.   • clonazePAM (KlonoPIN) 0.5 MG tablet Take 0.5 mg by mouth daily as needed.   • Probiotic Product (PROBIOTIC-10 PO)  Take 1 tablet by mouth daily.   • Multiple Vitamin (MULTIVITAMIN PO) Take 1 tablet by mouth daily.   • ondansetron (ZOFRAN) 4 MG tablet TAKE 1 TABLET BY MOUTH TWICE DAILY AS NEEDED FOR NAUSEA   • metoCLOPramide (REGLAN) 5 MG tablet Take 10 mg by mouth in the morning and 10 mg at noon and 10 mg in the evening. Indications: Diabetic Gastroparesis.   • cyanocobalamin 100 MCG tablet Take 100 mcg by mouth daily.   • cholecalciferol (VITAMIN D) 25 mcg (1,000 units) tablet Take 25 mcg by mouth daily. Indications: Vitamin D Deficiency   • dicyclomine (BENTYL) 10 MG capsule Take 10 mg by mouth in the morning and 10 mg at noon and 10 mg in the evening.   • acetone, urine, test (Ketostix) strip    • Lyumjev KwikPen 200 UNIT/ML Solution Pen-injector INJECT UP  UNITS UNDER THE SKIN DAILY AS NEEDED AS DIRECTED   • metFORMIN (GLUCOPHAGE-XR) 500 MG 24 hr tablet Take 2,000 mg by mouth daily (with dinner).   • blood glucose test strip    • Insulin Pen Needle 31G X 5 MM Misc 6-7/day   • norgestimate-ethinyl estradiol (ORTHO-CYCLEN) 0.25-35 MG-MCG per tablet Take 1 tablet by mouth daily.   • Continuous Blood Gluc Sensor (Dexcom G6 Sensor) Misc    • Continuous Blood Gluc Transmit (Dexcom G6 Transmitter) Misc    • glucagon (Glucagon Emergency) 1 MG injection kit U 1 MG IM PRF SEVERE HYPOGLYCEMIA UTD (Patient not taking: No sig reported)   • OneTouch Ultra test strip USE TO CHECK 5 TO 6 TIMES PER DAY     No current facility-administered medications for this visit.       PAST MEDICAL HISTORY  Past Medical History:   Diagnosis Date   • Diabetes mellitus (CMD)    • Gastritis 1/28/2022    Mild gastritis and esophagitis on endoscopy 1/2022.       PAST SURGICAL HISTORY:  Past Surgical History:   Procedure Laterality Date   • Esophagogastroduodenoscopy  01/28/2022   • Eye surgery      age 1   • Foot surgery Right 11/17/2022   • Wrist surgery         FAMILY HISTORY:  Family History   Problem Relation Age of Onset   • Cancer, Skin Melanoma  36 Hour(s) 16 Minute(s) Paternal Aunt    • Cancer, Liver Paternal Uncle    • Diabetes Maternal Grandfather    • Cancer, Ovarian Paternal Grandmother    • Heart disease Paternal Grandfather    • Diabetes Paternal Grandfather        SOCIAL HISTORY:  Social History     Tobacco Use   • Smoking status: Never   • Smokeless tobacco: Never   Vaping Use   • Vaping Use: never used   • Passive vaping exposure: Yes   Substance Use Topics   • Alcohol use: Never   • Drug use: Never       Review of Systems   Constitutional: Negative for fatigue and fever.   HENT: Negative for ear pain, sinus pressure, sinus pain, sore throat and voice change.    Eyes: Negative for pain, discharge and redness.   Respiratory: Negative for cough, chest tightness, shortness of breath, wheezing and stridor.    Cardiovascular: Negative for chest pain, palpitations and leg swelling.   Gastrointestinal: Negative for abdominal pain, blood in stool, constipation, diarrhea, nausea and vomiting.   Endocrine: Negative for cold intolerance, heat intolerance, polydipsia, polyphagia and polyuria.   Genitourinary: Negative for dysuria, flank pain, frequency and urgency.   Musculoskeletal: Negative for back pain, gait problem, joint swelling and myalgias.   Skin: Positive for color change and wound. Negative for rash.   Allergic/Immunologic: Negative for immunocompromised state.   Neurological: Negative for dizziness, syncope, speech difficulty, weakness and headaches.   Hematological: Negative for adenopathy. Does not bruise/bleed easily.   Psychiatric/Behavioral: Negative for agitation. The patient is not nervous/anxious.        Objective   Visit Vitals  /80 (BP Location: LUE - Left upper extremity, Patient Position: Sitting, Cuff Size: Large Adult)   Pulse (!) 107   Temp 98.1 °F (36.7 °C) (Oral)   Resp 20   Ht 5' 6\" (1.676 m)   Wt 90.7 kg (200 lb)   LMP 03/14/2023 (Approximate)   SpO2 97%   BMI 32.28 kg/m²           Physical Exam  Vitals and nursing note reviewed.    Constitutional:       Appearance: Normal appearance.   HENT:      Head: Normocephalic and atraumatic.      Right Ear: External ear normal.      Left Ear: External ear normal.      Mouth/Throat:      Mouth: Mucous membranes are moist.      Pharynx: Oropharynx is clear.      Neck: Normal range of motion and neck supple.   Eyes:      Extraocular Movements: Extraocular movements intact.      Conjunctiva/sclera: Conjunctivae normal.      Pupils: Pupils are equal, round, and reactive to light.   Cardiovascular:      Rate and Rhythm: Normal rate and regular rhythm.      Pulses: Normal pulses.      Heart sounds: Normal heart sounds.   Pulmonary:      Effort: Pulmonary effort is normal. No respiratory distress.      Breath sounds: Normal breath sounds. No stridor. No wheezing, rhonchi or rales.   Abdominal:      General: Abdomen is flat.      Palpations: Abdomen is soft.   Musculoskeletal:         General: Normal range of motion.   Skin:     General: Skin is warm and dry.      Comments: S/p partial toenail removal on bilateral great toes.  Soft tissue on the medial side of her great toes was erythematous, swelling with active drainage.   Neurological:      General: No focal deficit present.      Mental Status: She is alert and oriented to person, place, and time.   Psychiatric:         Mood and Affect: Mood normal.         Behavior: Behavior normal.         Today's lab results:  No results found for this visit on 03/19/23.     IMAGING:  No orders to display       Procedures       ASSESSMENT AND PLAN:  Problem List Items Addressed This Visit    None      17 years old female presented with infected ingrown toenail on bilateral great toes.  She may take the Keflex as prescribed.  Follow-up with podiatrist if no improvement in 2 to 3 days..    It was dressed with bacitracin and gauze.  She may change her dressing twice a day starting tomorrow.          Tye Villegas MD  3/19/2023             Instructions provided as documented in  the AVS.

## 2025-01-06 NOTE — ED CDU PROVIDER DISPOSITION NOTE - PATIENT PORTAL LINK FT
You can access the FollowMyHealth Patient Portal offered by [facility] by registering at the following website: [email or web address]. By joining [facility]’s FollowMyHealth portal, you will also be able to view your health information using other applications (apps) compatible with our system.

## 2025-01-06 NOTE — ED CDU PROVIDER SUBSEQUENT DAY NOTE - ATTENDING APP SHARED VISIT CONTRIBUTION OF CARE
Pt was seen and evaluated by me. Pt is a 73 y/o female with PMHx of HTN, HLD, and DM type 2 who presented to the ED for dry mouth and frequent urination X days. Pt was noted to have stopped medication for diabetes as her sugars were well controlled and when she was at her PMD the day PTA was found to have a glucose of over 800 with dry mouth and frequent urination. Pt denies any fever, chills, nausea, vomiting, SOB, chest pain, or abd pain. Pt was sent to OBS for Endocrine consult.  VITALS: Vitals have been reviewed.  GEN APPEARANCE: Alert and cooperative, non-toxic appearing and in NAD  HEAD: Atraumatic, normocephalic.   EYES: PERRL, EOMI.   EARS: Gross hearing intact.   NOSE: No nasal discharge.   THROAT: MMM. Oral cavity and pharynx normal.   CV: RRR, S1S2, no c/r/m/g. No cyanosis or pallor.   LUNGS: CTAB. No wheezing. No rales. No rhonchi. No diminished breath sounds.   ABDOMEN: Soft, NTND. No guarding or rebound.   MSK/EXT: Spine appears normal, no spine point tenderness.   NEURO: Alert, follows commands. Speech normal. Sensation and motor normal x4 extremities.   SKIN: Normal color for race, warm, dry and intact. No evidence of rash.  73 y/o female with PMHx of HTN, HLD, and DM type 2 who presented to the ED for dry mouth and frequent urination X days.   Concern for hyperglycemia with elevated A1C  Sent to OBS for endocrine consult and sugar management

## 2025-01-06 NOTE — ED CDU PROVIDER SUBSEQUENT DAY NOTE - CONSIDERATION OF ADMISSION OBSERVATION
Consideration of Admission/Observation Given concern for hyperglycemia with elevated A1C, sent to OBS for endocrine consult

## 2025-01-06 NOTE — ED CDU PROVIDER DISPOSITION NOTE - ATTENDING APP SHARED VISIT CONTRIBUTION OF CARE
Pt was seen and evaluated by me. Pt is a 73 y/o female with PMHx of HTN, HLD, and DM type 2 who presented to the ED for dry mouth and frequent urination X days. Pt was noted to have stopped medication for diabetes as her sugars were well controlled and when she was at her PMD the day PTA was found to have a glucose of over 800 with dry mouth and frequent urination. Pt denies any fever, chills, nausea, vomiting, SOB, chest pain, or abd pain. Pt was sent to OBS for Endocrine consult.  VITALS: Vitals have been reviewed.  GEN APPEARANCE: Alert and cooperative, non-toxic appearing and in NAD  HEAD: Atraumatic, normocephalic.   EYES: PERRL, EOMI.   EARS: Gross hearing intact.   NOSE: No nasal discharge.   THROAT: MMM. Oral cavity and pharynx normal.   CV: RRR, S1S2, no c/r/m/g. No cyanosis or pallor.   LUNGS: CTAB. No wheezing. No rales. No rhonchi. No diminished breath sounds.   ABDOMEN: Soft, NTND. No guarding or rebound.   MSK/EXT: Spine appears normal, no spine point tenderness.   NEURO: Alert, follows commands. Speech normal. Sensation and motor normal x4 extremities.   SKIN: Normal color for race, warm, dry and intact. No evidence of rash.  73 y/o female with PMHx of HTN, HLD, and DM type 2 who presented to the ED for dry mouth and frequent urination X days.   Concern for hyperglycemia with elevated A1C  Sent to OBS for endocrine consult and sugar management.

## 2025-01-06 NOTE — ED CDU PROVIDER DISPOSITION NOTE - CLINICAL COURSE
74-year-old female, accompanied by daughter, past medical history hypertension, hyperlipidemia, DM 2 (was previously on metformin however stopped it as her sugars were well controlled) presents for CC "high blood sugar". Patient had been feeling not so well and had dry mouth and was drinking lots of fluid. went to see PMD, had lab work yesterday and was told to go to ER for glucose over 800. Patient denies any fevers, chills, nausea, vomiting, chest pain, diarrhea, constipation, painful urination, new rashes. In ER initial glucose 678, beta hydroxybutyrate 1.9, A1c 11, VBG with no evidence of acidosis.  Patient was given 2 L of IV fluids, and admelog 10 units with improvement of glucose of 368 and beta hydroxybutyrate of 0.2.  Patient started on weight-based insulin regiment and placed in CDU for endocrine consult. Pt has been stable while in CDU, states polyuria and polydipsia has resolved.  Endocrine recommending lantus 16 units once daily and metform 500mg BID x 7 days then to increase to 1000mg BID. Spoke with [facility], pt will need testing supplies sent to her own 74-year-old female, accompanied by daughter, past medical history hypertension, hyperlipidemia, DM 2 (was previously on metformin however stopped it as her sugars were well controlled) presents for CC "high blood sugar". Patient had been feeling not so well and had dry mouth and was drinking lots of fluid. went to see PMD, had lab work yesterday and was told to go to ER for glucose over 800. Patient denies any fevers, chills, nausea, vomiting, chest pain, diarrhea, constipation, painful urination, new rashes. In ER initial glucose 678, beta hydroxybutyrate 1.9, A1c 11, VBG with no evidence of acidosis.  Patient was given 2 L of IV fluids, and admelog 10 units with improvement of glucose of 368 and beta hydroxybutyrate of 0.2.  Patient started on weight-based insulin regiment and placed in CDU for endocrine consult. Pt has been stable while in CDU, states polyuria and polydipsia has resolved.  Endocrine recommending lantus 16 units once daily and metform 500mg BID x 7 days then to increase to 1000mg BID. Spoke with [facility], pt will need testing supplies sent to her own pharmacy for part B coverage. Pt aware. At time of discharge pt is well appearing, vitals stable, asymptomatic at time of discharge, discussed discharge with CDU attending. 74-year-old female, accompanied by daughter, past medical history hypertension, hyperlipidemia, DM 2 (was previously on metformin however stopped it as her sugars were well controlled) presents for CC "high blood sugar". Patient had been feeling not so well and had dry mouth and was drinking lots of fluid. went to see PMD, had lab work yesterday and was told to go to ER for glucose over 800. Patient denies any fevers, chills, nausea, vomiting, chest pain, diarrhea, constipation, painful urination, new rashes. In ER initial glucose 678, beta hydroxybutyrate 1.9, A1c 11, VBG with no evidence of acidosis.  Patient was given 2 L of IV fluids, and admelog 10 units with improvement of glucose of 368 and beta hydroxybutyrate of 0.2.  Patient started on weight-based insulin regiment and placed in CDU for endocrine consult. Pt has been stable while in CDU, states polyuria and polydipsia has resolved.  Endocrine recommending lantus 16 units once daily and metform 500mg BID x 7 days then to increase to 1000mg BID. Spoke with [facility], pt will need testing supplies sent to her own pharmacy for part B coverage. Pt aware. At time of discharge pt is well appearing, vitals stable, asymptomatic at time of discharge, discussed discharge with CDU attending.  Patient seen by case management, determined that patient would benefit from home care services.

## 2025-01-06 NOTE — ED CDU PROVIDER SUBSEQUENT DAY NOTE - HISTORY
74-year-old female, accompanied by daughter, past medical history hypertension, hyperlipidemia, DM 2 (was previously on metformin however stopped it as her sugars were well controlled) presents for CC "high blood sugar". Patient had been feeling not so well and had dry mouth and was drinking lots of fluid. went to see PMD, had lab work yesterday and was told to go to ER for glucose over 800. Patient denies any fevers, chills, nausea, vomiting, chest pain, diarrhea, constipation, painful urination, new rashes. In ER initial glucose 678, beta hydroxybutyrate 1.9, A1c 11, VBG with no evidence of acidosis.  Patient was given 2 L of IV fluids, and admelog 10 units with improvement of glucose of 368 and beta hydroxybutyrate of 0.2.  Patient started on weight-based insulin regiment and placed in CDU for endocrine consult. Pt has been stable while in CDU, states polyuria and polydipsia has resolved.

## 2025-01-06 NOTE — DISCHARGE NOTE NURSING/CASE MANAGEMENT/SOCIAL WORK - PATIENT PORTAL LINK FT
You can access the FollowMyHealth Patient Portal offered by [facility] by registering at the following website: [email or web address]. By joining [facility]’s FollowMyHealth portal, you will also be able to view your health information using other applications (apps) compatible with our system.

## 2025-01-06 NOTE — PROGRESS NOTE ADULT - SUBJECTIVE AND OBJECTIVE BOX
Chief Complaint: DM type 2     Interval Events: FS above goal. Fair appetite - this is her baseline for the last 2 years. No N/V or abdominal pain.     MEDICATIONS  (STANDING):  atorvastatin 20 milliGRAM(s) Oral at bedtime  dextrose 5%. 1000 milliLiter(s) (100 mL/Hr) IV Continuous <Continuous>  dextrose 5%. 1000 milliLiter(s) (50 mL/Hr) IV Continuous <Continuous>  dextrose 50% Injectable 25 Gram(s) IV Push once  dextrose 50% Injectable 12.5 Gram(s) IV Push once  dextrose 50% Injectable 25 Gram(s) IV Push once  diltiazem    milliGRAM(s) Oral daily  glucagon  Injectable 1 milliGRAM(s) IntraMuscular once  hydrochlorothiazide 25 milliGRAM(s) Oral daily  insulin glargine Injectable (LANTUS) 13 Unit(s) SubCutaneous before breakfast  insulin lispro (ADMELOG) corrective regimen sliding scale   SubCutaneous three times a day before meals  insulin lispro (ADMELOG) corrective regimen sliding scale   SubCutaneous at bedtime  insulin lispro Injectable (ADMELOG) 7 Unit(s) SubCutaneous three times a day before meals  valsartan 320 milliGRAM(s) Oral daily    MEDICATIONS  (PRN):  dextrose Oral Gel 15 Gram(s) Oral once PRN Blood Glucose LESS THAN 70 milliGRAM(s)/deciliter      Allergies    codeine (Hives)    Intolerances      Review of Systems:  Constitutional: No fever/chills   Eyes: No blurry vision  Neuro: No tremors  HEENT: No pain  Cardiovascular: No chest pain, no palpitations  Respiratory: No SOB, no cough  GI: No nausea, vomiting or abdominal pain  : No dysuria  Skin: no rash  Psych: no depression  Endocrine: no polyuria, polydipsia      VITALS: T(C): 36.7 (01-06-25 @ 09:33)  T(F): 98 (01-06-25 @ 09:33), Max: 98.5 (01-05-25 @ 22:13)  HR: 77 (01-06-25 @ 09:33) (63 - 77)  BP: 102/63 (01-06-25 @ 09:33) (102/63 - 131/68)  RR:  (16 - 18)  SpO2:  (97% - 100%)  Wt(kg): --      Physical Exam:   GENERAL: not in distress  EYES: No proptosis, no lid lag, anicteric  HEENT:  Atraumatic, Normocephalic, moist mucous membranes  RESPIRATORY: non labored breathing   GI: Soft, nontender, non distended  SKIN: Dry, intact, No rashes or lesions  MUSCULOSKELETAL: Full range of motion, normal strength  NEURO: A&Ox3    CAPILLARY BLOOD GLUCOSE  POCT Blood Glucose.: 258 mg/dL (06 Jan 2025 11:22)  POCT Blood Glucose.: 262 mg/dL (06 Jan 2025 07:29)  POCT Blood Glucose.: 221 mg/dL (05 Jan 2025 21:36)  POCT Blood Glucose.: 329 mg/dL (05 Jan 2025 16:58)      01-05    137  |  104  |  30[H]  ----------------------------<  368[H]  3.7   |  23  |  0.85    eGFR: 71    Ca    8.4      01-05  Mg     2.10     01-05  Phos  2.1     01-05    TPro  8.0  /  Alb  4.6  /  TBili  0.4  /  DBili  x   /  AST  14  /  ALT  9   /  AlkPhos  100  01-04      A1C with Estimated Average Glucose Result: 11.0 % (01-04-25 @ 23:50)

## 2025-01-06 NOTE — ED CDU PROVIDER SUBSEQUENT DAY NOTE - CLINICAL SUMMARY MEDICAL DECISION MAKING FREE TEXT BOX
74-year-old female, accompanied by daughter, past medical history hypertension, hyperlipidemia, DM 2 (was previously on metformin however stopped it as her sugars were well controlled) presents for CC "high blood sugar". Patient had been feeling not so well and had dry mouth and was drinking lots of fluid. went to see PMD, had lab work yesterday and was told to go to ER for glucose over 800. Patient denies any fevers, chills, nausea, vomiting, chest pain, diarrhea, constipation, painful urination, new rashes. In ER initial glucose 678, beta hydroxybutyrate 1.9, A1c 11, VBG with no evidence of acidosis.  Patient was given 2 L of IV fluids, and admelog 10 units with improvement of glucose of 368 and beta hydroxybutyrate of 0.2.  Patient started on weight-based insulin regiment and placed in CDU for endocrine consult. Pt has been stable while in CDU, pending Endocrinology reassessment.

## 2025-01-06 NOTE — ED CDU PROVIDER SUBSEQUENT DAY NOTE - PROGRESS NOTE DETAILS
Endocrine recommending lantus 16 units once daily and metformin 500mg BID x 7 days then to increase to 1000mg BID. Spoke with [facility], pt will need testing supplies sent to her own pharmacy for part B coverage. Pt aware. At time of discharge pt is well appearing, vitals stable, asymptomatic at time of discharge, discussed discharge with CDU attending.

## 2025-01-08 ENCOUNTER — NON-APPOINTMENT (OUTPATIENT)
Age: 75
End: 2025-01-08

## 2025-01-08 ENCOUNTER — APPOINTMENT (OUTPATIENT)
Dept: ORTHOPEDIC SURGERY | Facility: CLINIC | Age: 75
End: 2025-01-08
Payer: MEDICARE

## 2025-01-08 VITALS — BODY MASS INDEX: 28.13 KG/M2 | WEIGHT: 149 LBS | HEIGHT: 61 IN

## 2025-01-08 DIAGNOSIS — M17.0 BILATERAL PRIMARY OSTEOARTHRITIS OF KNEE: ICD-10-CM

## 2025-01-08 PROCEDURE — 73564 X-RAY EXAM KNEE 4 OR MORE: CPT | Mod: 50

## 2025-01-08 PROCEDURE — 99204 OFFICE O/P NEW MOD 45 MIN: CPT

## 2025-03-14 ENCOUNTER — APPOINTMENT (OUTPATIENT)
Dept: ENDOCRINOLOGY | Facility: CLINIC | Age: 75
End: 2025-03-14

## 2025-03-14 VITALS
HEIGHT: 61 IN | OXYGEN SATURATION: 99 % | SYSTOLIC BLOOD PRESSURE: 125 MMHG | WEIGHT: 134 LBS | DIASTOLIC BLOOD PRESSURE: 75 MMHG | HEART RATE: 76 BPM | BODY MASS INDEX: 25.3 KG/M2

## 2025-03-14 DIAGNOSIS — I10 ESSENTIAL (PRIMARY) HYPERTENSION: ICD-10-CM

## 2025-03-14 DIAGNOSIS — E11.9 TYPE 2 DIABETES MELLITUS W/OUT COMPLICATIONS: ICD-10-CM

## 2025-03-14 DIAGNOSIS — E78.5 HYPERLIPIDEMIA, UNSPECIFIED: ICD-10-CM

## 2025-03-14 PROCEDURE — 99215 OFFICE O/P EST HI 40 MIN: CPT

## 2025-03-14 PROCEDURE — G2211 COMPLEX E/M VISIT ADD ON: CPT

## 2025-03-14 PROCEDURE — 82962 GLUCOSE BLOOD TEST: CPT

## 2025-03-14 PROCEDURE — 95251 CONT GLUC MNTR ANALYSIS I&R: CPT

## 2025-03-14 RX ORDER — SITAGLIPTIN 100 MG/1
100 TABLET, FILM COATED ORAL DAILY
Qty: 1 | Refills: 3 | Status: ACTIVE | COMMUNITY
Start: 2025-03-14 | End: 1900-01-01

## 2025-03-15 ENCOUNTER — TRANSCRIPTION ENCOUNTER (OUTPATIENT)
Age: 75
End: 2025-03-15

## 2025-03-16 LAB — GLUCOSE BLDC GLUCOMTR-MCNC: 152

## 2025-03-20 ENCOUNTER — APPOINTMENT (OUTPATIENT)
Facility: CLINIC | Age: 75
End: 2025-03-20

## 2025-03-31 ENCOUNTER — APPOINTMENT (OUTPATIENT)
Dept: ENDOCRINOLOGY | Facility: CLINIC | Age: 75
End: 2025-03-31
Payer: MEDICARE

## 2025-03-31 DIAGNOSIS — E11.9 TYPE 2 DIABETES MELLITUS W/OUT COMPLICATIONS: ICD-10-CM

## 2025-03-31 PROCEDURE — G0108 DIAB MANAGE TRN  PER INDIV: CPT
